# Patient Record
Sex: FEMALE | Race: WHITE | NOT HISPANIC OR LATINO | Employment: UNEMPLOYED | URBAN - METROPOLITAN AREA
[De-identification: names, ages, dates, MRNs, and addresses within clinical notes are randomized per-mention and may not be internally consistent; named-entity substitution may affect disease eponyms.]

---

## 2018-07-12 ENCOUNTER — HISTORICAL (OUTPATIENT)
Dept: ADMINISTRATIVE | Facility: HOSPITAL | Age: 53
End: 2018-07-12

## 2018-07-12 LAB
CREAT UR-MCNC: 81 MG/DL
MICROALBUMIN UR-MCNC: 69 MG/L (ref 0–19)
MICROALBUMIN/CREAT RATIO PNL UR: 85.2 MCG/MG CR (ref 0–29)

## 2018-09-10 ENCOUNTER — HISTORICAL (OUTPATIENT)
Dept: INTERNAL MEDICINE | Facility: CLINIC | Age: 53
End: 2018-09-10

## 2018-10-10 ENCOUNTER — HISTORICAL (OUTPATIENT)
Dept: INTERNAL MEDICINE | Facility: CLINIC | Age: 53
End: 2018-10-10

## 2018-10-10 LAB
ABS NEUT (OLG): 4.97 X10(3)/MCL (ref 2.1–9.2)
ALBUMIN SERPL-MCNC: 3.2 GM/DL (ref 3.4–5)
ALBUMIN/GLOB SERPL: 1 RATIO (ref 1–2)
ALP SERPL-CCNC: 72 UNIT/L (ref 45–117)
ALT SERPL-CCNC: 17 UNIT/L (ref 12–78)
AST SERPL-CCNC: 7 UNIT/L (ref 15–37)
BASOPHILS # BLD AUTO: 0.04 X10(3)/MCL
BASOPHILS NFR BLD AUTO: 0 %
BILIRUB SERPL-MCNC: 0.2 MG/DL (ref 0.2–1)
BILIRUBIN DIRECT+TOT PNL SERPL-MCNC: <0.1 MG/DL
BILIRUBIN DIRECT+TOT PNL SERPL-MCNC: ABNORMAL MG/DL
BUN SERPL-MCNC: 15 MG/DL (ref 7–18)
CALCIUM SERPL-MCNC: 9 MG/DL (ref 8.5–10.1)
CHLORIDE SERPL-SCNC: 103 MMOL/L (ref 98–107)
CHOLEST SERPL-MCNC: 294 MG/DL
CHOLEST/HDLC SERPL: 4.9 {RATIO} (ref 0–4.4)
CO2 SERPL-SCNC: 29 MMOL/L (ref 21–32)
CREAT SERPL-MCNC: 0.7 MG/DL (ref 0.6–1.3)
EOSINOPHIL # BLD AUTO: 0.24 X10(3)/MCL
EOSINOPHIL NFR BLD AUTO: 3 %
ERYTHROCYTE [DISTWIDTH] IN BLOOD BY AUTOMATED COUNT: 12.6 % (ref 11.5–14.5)
EST. AVERAGE GLUCOSE BLD GHB EST-MCNC: 272 MG/DL
GLOBULIN SER-MCNC: 4.5 GM/ML (ref 2.3–3.5)
GLUCOSE SERPL-MCNC: 163 MG/DL (ref 74–106)
HBA1C MFR BLD: 11.1 % (ref 4.2–6.3)
HCT VFR BLD AUTO: 43.2 % (ref 35–46)
HDLC SERPL-MCNC: 60 MG/DL
HGB BLD-MCNC: 14.1 GM/DL (ref 12–16)
IMM GRANULOCYTES # BLD AUTO: 0.1 10*3/UL
IMM GRANULOCYTES NFR BLD AUTO: 1 %
LDLC SERPL CALC-MCNC: 191 MG/DL (ref 0–130)
LYMPHOCYTES # BLD AUTO: 3.2 X10(3)/MCL
LYMPHOCYTES NFR BLD AUTO: 34 % (ref 13–40)
MCH RBC QN AUTO: 28.8 PG (ref 26–34)
MCHC RBC AUTO-ENTMCNC: 32.6 GM/DL (ref 31–37)
MCV RBC AUTO: 88.3 FL (ref 80–100)
MONOCYTES # BLD AUTO: 0.75 X10(3)/MCL
MONOCYTES NFR BLD AUTO: 8 % (ref 4–12)
NEUTROPHILS # BLD AUTO: 4.97 X10(3)/MCL
NEUTROPHILS NFR BLD AUTO: 53 X10(3)/MCL
PLATELET # BLD AUTO: 303 X10(3)/MCL (ref 130–400)
PMV BLD AUTO: 10 FL (ref 7.4–10.4)
POTASSIUM SERPL-SCNC: 3.8 MMOL/L (ref 3.5–5.1)
PROT SERPL-MCNC: 7.7 GM/DL (ref 6.4–8.2)
RBC # BLD AUTO: 4.89 X10(6)/MCL (ref 4–5.2)
SODIUM SERPL-SCNC: 139 MMOL/L (ref 136–145)
TRIGL SERPL-MCNC: 214 MG/DL
TSH SERPL-ACNC: 3.15 MIU/L (ref 0.36–3.74)
VLDLC SERPL CALC-MCNC: 43 MG/DL
WBC # SPEC AUTO: 9.3 X10(3)/MCL (ref 4.5–11)

## 2019-07-22 ENCOUNTER — HISTORICAL (OUTPATIENT)
Dept: RADIOLOGY | Facility: HOSPITAL | Age: 54
End: 2019-07-22

## 2019-08-16 ENCOUNTER — HISTORICAL (OUTPATIENT)
Dept: SURGERY | Facility: HOSPITAL | Age: 54
End: 2019-08-16

## 2019-12-30 ENCOUNTER — HISTORICAL (OUTPATIENT)
Dept: ADMINISTRATIVE | Facility: HOSPITAL | Age: 54
End: 2019-12-30

## 2019-12-30 LAB
ABS NEUT (OLG): 5.91 X10(3)/MCL (ref 2.1–9.2)
ALBUMIN SERPL-MCNC: 3.3 GM/DL (ref 3.4–5)
ALBUMIN/GLOB SERPL: 0.7 RATIO (ref 1.1–2)
ALP SERPL-CCNC: 82 UNIT/L (ref 45–117)
ALT SERPL-CCNC: 18 UNIT/L (ref 12–78)
AST SERPL-CCNC: 10 UNIT/L (ref 15–37)
BASOPHILS # BLD AUTO: 0 X10(3)/MCL (ref 0–0.2)
BASOPHILS NFR BLD AUTO: 0 %
BILIRUB SERPL-MCNC: 0.2 MG/DL (ref 0.2–1)
BILIRUBIN DIRECT+TOT PNL SERPL-MCNC: <0.1 MG/DL (ref 0–0.2)
BILIRUBIN DIRECT+TOT PNL SERPL-MCNC: ABNORMAL MG/DL
BUN SERPL-MCNC: 13 MG/DL (ref 7–18)
CALCIUM SERPL-MCNC: 8.8 MG/DL (ref 8.5–10.1)
CHLORIDE SERPL-SCNC: 102 MMOL/L (ref 98–107)
CHOLEST SERPL-MCNC: 323 MG/DL
CHOLEST/HDLC SERPL: 4.7 {RATIO} (ref 0–4.4)
CO2 SERPL-SCNC: 28 MMOL/L (ref 21–32)
CREAT SERPL-MCNC: 1 MG/DL (ref 0.6–1.3)
EOSINOPHIL # BLD AUTO: 0.2 X10(3)/MCL (ref 0–0.9)
EOSINOPHIL NFR BLD AUTO: 2 %
ERYTHROCYTE [DISTWIDTH] IN BLOOD BY AUTOMATED COUNT: 12.7 % (ref 11.5–14.5)
EST. AVERAGE GLUCOSE BLD GHB EST-MCNC: 266 MG/DL
GLOBULIN SER-MCNC: 4.5 GM/ML (ref 2.3–3.5)
GLUCOSE SERPL-MCNC: 204 MG/DL (ref 74–106)
HBA1C MFR BLD: 10.9 % (ref 4.2–6.3)
HCT VFR BLD AUTO: 41.1 % (ref 35–46)
HDLC SERPL-MCNC: 69 MG/DL (ref 40–59)
HGB BLD-MCNC: 12.8 GM/DL (ref 12–16)
IMM GRANULOCYTES # BLD AUTO: 0.07 10*3/UL
IMM GRANULOCYTES NFR BLD AUTO: 1 %
LDLC SERPL CALC-MCNC: 205 MG/DL
LYMPHOCYTES # BLD AUTO: 1.2 X10(3)/MCL (ref 0.6–4.6)
LYMPHOCYTES NFR BLD AUTO: 15 %
MCH RBC QN AUTO: 29.3 PG (ref 26–34)
MCHC RBC AUTO-ENTMCNC: 31.1 GM/DL (ref 31–37)
MCV RBC AUTO: 94.1 FL (ref 80–100)
MONOCYTES # BLD AUTO: 0.8 X10(3)/MCL (ref 0.1–1.3)
MONOCYTES NFR BLD AUTO: 10 %
NEUTROPHILS # BLD AUTO: 5.91 X10(3)/MCL (ref 2.1–9.2)
NEUTROPHILS NFR BLD AUTO: 72 %
PLATELET # BLD AUTO: 299 X10(3)/MCL (ref 130–400)
PMV BLD AUTO: 10 FL (ref 7.4–10.4)
POTASSIUM SERPL-SCNC: 4.8 MMOL/L (ref 3.5–5.1)
PROT SERPL-MCNC: 7.8 GM/DL (ref 6.4–8.2)
RBC # BLD AUTO: 4.37 X10(6)/MCL (ref 4–5.2)
SODIUM SERPL-SCNC: 134 MMOL/L (ref 136–145)
TRIGL SERPL-MCNC: 246 MG/DL
TSH SERPL-ACNC: 3.45 MIU/L (ref 0.36–3.74)
VLDLC SERPL CALC-MCNC: 49 MG/DL
WBC # SPEC AUTO: 8.2 X10(3)/MCL (ref 4.5–11)

## 2021-12-20 ENCOUNTER — HOSPITAL ENCOUNTER (OUTPATIENT)
Dept: MEDSURG UNIT | Facility: HOSPITAL | Age: 56
End: 2021-12-22
Attending: HOSPITALIST | Admitting: STUDENT IN AN ORGANIZED HEALTH CARE EDUCATION/TRAINING PROGRAM

## 2021-12-20 LAB
% SATURATION: 89.4 %
ABS NEUT (OLG): 11.44 X10(3)/MCL (ref 2.1–9.2)
ALBUMIN SERPL-MCNC: 2.6 GM/DL (ref 3.5–5)
ALBUMIN SERPL-MCNC: 2.9 GM/DL (ref 3.5–5)
ALBUMIN/GLOB SERPL: 0.6 RATIO (ref 1.1–2)
ALBUMIN/GLOB SERPL: 0.6 RATIO (ref 1.1–2)
ALP SERPL-CCNC: 74 UNIT/L (ref 40–150)
ALP SERPL-CCNC: 85 UNIT/L (ref 40–150)
ALT SERPL-CCNC: 11 UNIT/L (ref 0–55)
ALT SERPL-CCNC: 7 UNIT/L (ref 0–55)
APPEARANCE, UA: CLEAR
AST SERPL-CCNC: 8 UNIT/L (ref 5–34)
AST SERPL-CCNC: 8 UNIT/L (ref 5–34)
B-OH-BUTYR SERPL-MCNC: 3.08 MMOL/L
B-OH-BUTYR SERPL-MCNC: 8.73 MMOL/L
BACTERIA SPEC CULT: ABNORMAL
BASOPHILS # BLD AUTO: 0 X10(3)/MCL (ref 0–0.2)
BASOPHILS NFR BLD AUTO: 0 %
BILIRUB SERPL-MCNC: 0.2 MG/DL
BILIRUB SERPL-MCNC: 0.3 MG/DL
BILIRUB UR QL STRIP: NEGATIVE
BILIRUBIN DIRECT+TOT PNL SERPL-MCNC: 0.1 MG/DL (ref 0–0.5)
BILIRUBIN DIRECT+TOT PNL SERPL-MCNC: 0.1 MG/DL (ref 0–0.8)
BILIRUBIN DIRECT+TOT PNL SERPL-MCNC: 0.1 MG/DL (ref 0–0.8)
BILIRUBIN DIRECT+TOT PNL SERPL-MCNC: 0.2 MG/DL (ref 0–0.5)
BUN SERPL-MCNC: 16.5 MG/DL (ref 9.8–20.1)
BUN SERPL-MCNC: 16.6 MG/DL (ref 9.8–20.1)
BUN SERPL-MCNC: 16.8 MG/DL (ref 9.8–20.1)
BUN SERPL-MCNC: 19.3 MG/DL (ref 9.8–20.1)
CALCIUM SERPL-MCNC: 8.9 MG/DL (ref 8.7–10.5)
CALCIUM SERPL-MCNC: 9 MG/DL (ref 8.7–10.5)
CALCIUM SERPL-MCNC: 9.4 MG/DL (ref 8.7–10.5)
CALCIUM SERPL-MCNC: 9.6 MG/DL (ref 8.7–10.5)
CHLORIDE SERPL-SCNC: 103 MMOL/L (ref 98–107)
CHLORIDE SERPL-SCNC: 104 MMOL/L (ref 98–107)
CHLORIDE SERPL-SCNC: 105 MMOL/L (ref 98–107)
CHLORIDE SERPL-SCNC: 94 MMOL/L (ref 98–107)
CK MB SERPL-MCNC: 0.6 NG/ML
CK SERPL-CCNC: 36 U/L (ref 29–168)
CO HGB VEN: 2.9 % (ref 0.5–1.5)
CO2 SERPL-SCNC: 16 MMOL/L (ref 22–29)
CO2 SERPL-SCNC: 21 MMOL/L (ref 22–29)
CO2 SERPL-SCNC: 23 MMOL/L (ref 22–29)
CO2 SERPL-SCNC: 23 MMOL/L (ref 22–29)
COLOR UR: COLORLESS
CREAT SERPL-MCNC: 0.99 MG/DL (ref 0.55–1.02)
CREAT SERPL-MCNC: 1.07 MG/DL (ref 0.55–1.02)
CREAT SERPL-MCNC: 1.19 MG/DL (ref 0.55–1.02)
CREAT SERPL-MCNC: 1.47 MG/DL (ref 0.55–1.02)
CREAT/UREA NIT SERPL: 16
CREAT/UREA NIT SERPL: 17
D BASE VENOUS: -16.3 (ref -2–2)
EOSINOPHIL # BLD AUTO: 0 X10(3)/MCL (ref 0–0.9)
EOSINOPHIL NFR BLD AUTO: 0 %
ERYTHROCYTE [DISTWIDTH] IN BLOOD BY AUTOMATED COUNT: 13.1 % (ref 11.5–14.5)
FLUAV AG UPPER RESP QL IA.RAPID: NEGATIVE
FLUBV AG UPPER RESP QL IA.RAPID: NEGATIVE
GLOBULIN SER-MCNC: 4.4 GM/DL (ref 2.4–3.5)
GLOBULIN SER-MCNC: 4.7 GM/DL (ref 2.4–3.5)
GLUCOSE (UA): ABNORMAL /UL
GLUCOSE SERPL-MCNC: 168 MG/DL (ref 74–100)
GLUCOSE SERPL-MCNC: 174 MG/DL (ref 74–100)
GLUCOSE SERPL-MCNC: 266 MG/DL (ref 74–100)
GLUCOSE SERPL-MCNC: 620 MG/DL (ref 74–100)
HCO3 VENOUS: 13 MMOL/L (ref 25–40)
HCT VFR BLD AUTO: 42.8 % (ref 35–46)
HGB BLD-MCNC: 13.1 GM/DL (ref 12–16)
HGB UR QL STRIP: NEGATIVE /HPF
HYALINE CASTS #/AREA URNS LPF: ABNORMAL /LPF
IMM GRANULOCYTES # BLD AUTO: 0.08 10*3/UL
IMM GRANULOCYTES NFR BLD AUTO: 0 %
KETONES UR QL STRIP: ABNORMAL /UL
LACTATE SERPL-SCNC: 1.5 MMOL/L (ref 0.5–2.2)
LEUKOCYTE ESTERASE UR QL STRIP: 75
LIPASE SERPL-CCNC: 15 U/L
LYMPHOCYTES # BLD AUTO: 1.8 X10(3)/MCL (ref 0.6–4.6)
LYMPHOCYTES NFR BLD AUTO: 12 %
MAGNESIUM SERPL-MCNC: 1.6 MG/DL (ref 1.6–2.6)
MAGNESIUM SERPL-MCNC: 1.6 MG/DL (ref 1.6–2.6)
MAGNESIUM SERPL-MCNC: 1.7 MG/DL (ref 1.6–2.6)
MAGNESIUM SERPL-MCNC: 1.9 MG/DL (ref 1.6–2.6)
MCH RBC QN AUTO: 28.8 PG (ref 26–34)
MCHC RBC AUTO-ENTMCNC: 30.6 GM/DL (ref 31–37)
MCV RBC AUTO: 94.1 FL (ref 80–100)
MONOCYTES # BLD AUTO: 1.4 X10(3)/MCL (ref 0.1–1.3)
MONOCYTES NFR BLD AUTO: 9 %
MRSA SCREEN BY PCR: POSITIVE
MUCOUS THREADS URNS QL MICRO: ABNORMAL /LPF
NEUTROPHILS # BLD AUTO: 11.44 X10(3)/MCL (ref 2.1–9.2)
NEUTROPHILS NFR BLD AUTO: 78 %
NITRITE UR QL STRIP: NEGATIVE
NRBC BLD AUTO-RTO: 0 % (ref 0–0.2)
O2 HGB VENOUS: 86.8 % (ref 40–85)
PCO2 VENOUS: 41 MMHG (ref 41–51)
PH UR STRIP: 5.5 /UL (ref 4.5–8)
PH VENOUS: 7.1 (ref 7.32–7.42)
PHENYTOIN SERPL-MCNC: <1.8 UG/ML (ref 10–20)
PHOSPHATE SERPL-MCNC: 1.3 MG/DL (ref 2.3–4.7)
PHOSPHATE SERPL-MCNC: 1.7 MG/DL (ref 2.3–4.7)
PHOSPHATE SERPL-MCNC: 2.1 MG/DL (ref 2.3–4.7)
PHOSPHATE SERPL-MCNC: 3.8 MG/DL (ref 2.3–4.7)
PLATELET # BLD AUTO: 307 X10(3)/MCL (ref 130–400)
PMV BLD AUTO: 11 FL (ref 7.4–10.4)
PO2 VENOUS: 62 MMHG (ref 30–100)
POTASSIUM SERPL-SCNC: 3.8 MMOL/L (ref 3.5–5.1)
POTASSIUM SERPL-SCNC: 3.9 MMOL/L (ref 3.5–5.1)
POTASSIUM SERPL-SCNC: 4.5 MMOL/L (ref 3.5–5.1)
POTASSIUM SERPL-SCNC: 5.5 MMOL/L (ref 3.5–5.1)
PROT SERPL-MCNC: 7 GM/DL (ref 6.4–8.3)
PROT SERPL-MCNC: 7.6 GM/DL (ref 6.4–8.3)
PROT UR QL STRIP: ABNORMAL /UL
RBC # BLD AUTO: 4.55 X10(6)/MCL (ref 4–5.2)
RBC #/AREA URNS HPF: ABNORMAL /HPF
SAMPLE VEN: ABNORMAL
SARS-COV-2 RNA RESP QL NAA+PROBE: NOT DETECTED
SITE VEN: ABNORMAL
SODIUM SERPL-SCNC: 132 MMOL/L (ref 136–145)
SODIUM SERPL-SCNC: 136 MMOL/L (ref 136–145)
SODIUM SERPL-SCNC: 137 MMOL/L (ref 136–145)
SODIUM SERPL-SCNC: 137 MMOL/L (ref 136–145)
SP GR UR STRIP: 1.02 (ref 1–1.03)
SQUAMOUS EPITHELIAL, UA: ABNORMAL /LPF
THB VEN: 12.9 GM/DL (ref 12–18)
TREATMENT VEN: ABNORMAL
TROPONIN I SERPL-MCNC: 0.01 NG/ML (ref 0–0.04)
UROBILINOGEN UR STRIP-ACNC: NORMAL /HPF
WBC # SPEC AUTO: 14.8 X10(3)/MCL (ref 4.5–11)
WBC #/AREA URNS HPF: ABNORMAL /HPF

## 2021-12-21 LAB
ABS NEUT (OLG): 9.8 X10(3)/MCL (ref 2.1–9.2)
ALBUMIN SERPL-MCNC: 2.1 GM/DL (ref 3.5–5)
ALBUMIN/GLOB SERPL: 0.6 RATIO (ref 1.1–2)
ALP SERPL-CCNC: 59 UNIT/L (ref 40–150)
ALT SERPL-CCNC: 5 UNIT/L (ref 0–55)
AST SERPL-CCNC: 9 UNIT/L (ref 5–34)
BASOPHILS # BLD AUTO: 0 X10(3)/MCL (ref 0–0.2)
BASOPHILS NFR BLD AUTO: 0 %
BILIRUB SERPL-MCNC: 0.1 MG/DL
BILIRUBIN DIRECT+TOT PNL SERPL-MCNC: 0 MG/DL (ref 0–0.8)
BILIRUBIN DIRECT+TOT PNL SERPL-MCNC: 0.1 MG/DL (ref 0–0.5)
BUN SERPL-MCNC: 18.1 MG/DL (ref 9.8–20.1)
CALCIUM SERPL-MCNC: 8.5 MG/DL (ref 8.7–10.5)
CHLORIDE SERPL-SCNC: 103 MMOL/L (ref 98–107)
CO2 SERPL-SCNC: 20 MMOL/L (ref 22–29)
CREAT SERPL-MCNC: 1.16 MG/DL (ref 0.55–1.02)
EOSINOPHIL # BLD AUTO: 0 X10(3)/MCL (ref 0–0.9)
EOSINOPHIL NFR BLD AUTO: 0 %
ERYTHROCYTE [DISTWIDTH] IN BLOOD BY AUTOMATED COUNT: 13.2 % (ref 11.5–14.5)
GLOBULIN SER-MCNC: 3.7 GM/DL (ref 2.4–3.5)
GLUCOSE SERPL-MCNC: 243 MG/DL (ref 74–100)
HCT VFR BLD AUTO: 31.6 % (ref 35–46)
HGB BLD-MCNC: 10.2 GM/DL (ref 12–16)
IMM GRANULOCYTES # BLD AUTO: 0.07 10*3/UL
IMM GRANULOCYTES NFR BLD AUTO: 0 %
LYMPHOCYTES # BLD AUTO: 2.2 X10(3)/MCL (ref 0.6–4.6)
LYMPHOCYTES NFR BLD AUTO: 16 %
MAGNESIUM SERPL-MCNC: 1.7 MG/DL (ref 1.6–2.6)
MCH RBC QN AUTO: 29.3 PG (ref 26–34)
MCHC RBC AUTO-ENTMCNC: 32.3 GM/DL (ref 31–37)
MCV RBC AUTO: 90.8 FL (ref 80–100)
MONOCYTES # BLD AUTO: 1.3 X10(3)/MCL (ref 0.1–1.3)
MONOCYTES NFR BLD AUTO: 10 %
NEUTROPHILS # BLD AUTO: 9.8 X10(3)/MCL (ref 2.1–9.2)
NEUTROPHILS NFR BLD AUTO: 73 %
NRBC BLD AUTO-RTO: 0 % (ref 0–0.2)
PHOSPHATE SERPL-MCNC: 2.2 MG/DL (ref 2.3–4.7)
PLATELET # BLD AUTO: 263 X10(3)/MCL (ref 130–400)
PMV BLD AUTO: 10.9 FL (ref 7.4–10.4)
POTASSIUM SERPL-SCNC: 3.9 MMOL/L (ref 3.5–5.1)
PROT SERPL-MCNC: 5.8 GM/DL (ref 6.4–8.3)
RBC # BLD AUTO: 3.48 X10(6)/MCL (ref 4–5.2)
SODIUM SERPL-SCNC: 135 MMOL/L (ref 136–145)
WBC # SPEC AUTO: 13.4 X10(3)/MCL (ref 4.5–11)

## 2021-12-22 LAB
ABS NEUT (OLG): 5.59 X10(3)/MCL (ref 2.1–9.2)
ALBUMIN SERPL-MCNC: 2.4 GM/DL (ref 3.5–5)
ALBUMIN/GLOB SERPL: 0.5 RATIO (ref 1.1–2)
ALP SERPL-CCNC: 68 UNIT/L (ref 40–150)
ALT SERPL-CCNC: 7 UNIT/L (ref 0–55)
AMMONIA PLAS-MSCNC: 27.7 UMOL/L (ref 18–72)
AST SERPL-CCNC: 12 UNIT/L (ref 5–34)
BASOPHILS # BLD AUTO: 0 X10(3)/MCL (ref 0–0.2)
BASOPHILS NFR BLD AUTO: 0 %
BILIRUB SERPL-MCNC: 0.2 MG/DL
BILIRUBIN DIRECT+TOT PNL SERPL-MCNC: 0.1 MG/DL (ref 0–0.5)
BILIRUBIN DIRECT+TOT PNL SERPL-MCNC: 0.1 MG/DL (ref 0–0.8)
BUN SERPL-MCNC: 12.5 MG/DL (ref 9.8–20.1)
CALCIUM SERPL-MCNC: 9.2 MG/DL (ref 8.7–10.5)
CHLORIDE SERPL-SCNC: 103 MMOL/L (ref 98–107)
CO2 SERPL-SCNC: 24 MMOL/L (ref 22–29)
CREAT SERPL-MCNC: 0.73 MG/DL (ref 0.55–1.02)
EOSINOPHIL # BLD AUTO: 0.1 X10(3)/MCL (ref 0–0.9)
EOSINOPHIL NFR BLD AUTO: 1 %
ERYTHROCYTE [DISTWIDTH] IN BLOOD BY AUTOMATED COUNT: 13.3 % (ref 11.5–14.5)
FINAL CULTURE: NORMAL
FOLATE SERPL-MCNC: 11.3 NG/ML (ref 7–31.4)
GLOBULIN SER-MCNC: 4.4 GM/DL (ref 2.4–3.5)
GLUCOSE SERPL-MCNC: 157 MG/DL (ref 74–100)
HCT VFR BLD AUTO: 34.8 % (ref 35–46)
HGB BLD-MCNC: 10.8 GM/DL (ref 12–16)
IMM GRANULOCYTES # BLD AUTO: 0.04 10*3/UL
IMM GRANULOCYTES NFR BLD AUTO: 0 %
LYMPHOCYTES # BLD AUTO: 2.1 X10(3)/MCL (ref 0.6–4.6)
LYMPHOCYTES NFR BLD AUTO: 24 %
MCH RBC QN AUTO: 28.8 PG (ref 26–34)
MCHC RBC AUTO-ENTMCNC: 31 GM/DL (ref 31–37)
MCV RBC AUTO: 92.8 FL (ref 80–100)
MONOCYTES # BLD AUTO: 0.7 X10(3)/MCL (ref 0.1–1.3)
MONOCYTES NFR BLD AUTO: 8 %
NEUTROPHILS # BLD AUTO: 5.59 X10(3)/MCL (ref 2.1–9.2)
NEUTROPHILS NFR BLD AUTO: 66 %
NRBC BLD AUTO-RTO: 0 % (ref 0–0.2)
PLATELET # BLD AUTO: 278 X10(3)/MCL (ref 130–400)
PMV BLD AUTO: 10.6 FL (ref 7.4–10.4)
POTASSIUM SERPL-SCNC: 4 MMOL/L (ref 3.5–5.1)
PROT SERPL-MCNC: 6.8 GM/DL (ref 6.4–8.3)
RBC # BLD AUTO: 3.75 X10(6)/MCL (ref 4–5.2)
SODIUM SERPL-SCNC: 136 MMOL/L (ref 136–145)
T PALLIDUM AB SER QL: NONREACTIVE
TSH SERPL-ACNC: 0.54 UIU/ML (ref 0.35–4.94)
VANCOMYCIN TROUGH SERPL-MCNC: 17.3 UG/ML (ref 15–20)
VIT B12 SERPL-MCNC: 1093 PG/ML (ref 213–816)
WBC # SPEC AUTO: 8.5 X10(3)/MCL (ref 4.5–11)

## 2021-12-25 LAB
FINAL CULTURE: NORMAL
FINAL CULTURE: NORMAL

## 2022-01-26 ENCOUNTER — HISTORICAL (OUTPATIENT)
Dept: ADMINISTRATIVE | Facility: HOSPITAL | Age: 57
End: 2022-01-26

## 2022-01-26 LAB
ABS NEUT (OLG): 5.35 X10(3)/MCL (ref 2.1–9.2)
ALBUMIN SERPL-MCNC: 3.3 GM/DL (ref 3.5–5)
ALBUMIN/GLOB SERPL: 0.7 RATIO (ref 1.1–2)
ALP SERPL-CCNC: 99 UNIT/L (ref 40–150)
ALT SERPL-CCNC: 14 UNIT/L (ref 0–55)
APPEARANCE, UA: ABNORMAL
AST SERPL-CCNC: 12 UNIT/L (ref 5–34)
BACTERIA SPEC CULT: ABNORMAL /HPF
BASOPHILS # BLD AUTO: 0.1 X10(3)/MCL (ref 0–0.2)
BASOPHILS NFR BLD AUTO: 1 %
BILIRUB SERPL-MCNC: 0.2 MG/DL
BILIRUB UR QL STRIP: NEGATIVE
BILIRUBIN DIRECT+TOT PNL SERPL-MCNC: 0.1 MG/DL (ref 0–0.5)
BILIRUBIN DIRECT+TOT PNL SERPL-MCNC: 0.1 MG/DL (ref 0–0.8)
BUN SERPL-MCNC: 16.3 MG/DL (ref 9.8–20.1)
CALCIUM SERPL-MCNC: 10.2 MG/DL (ref 8.7–10.5)
CHLORIDE SERPL-SCNC: 102 MMOL/L (ref 98–107)
CHOLEST SERPL-MCNC: 253 MG/DL
CHOLEST/HDLC SERPL: 5 {RATIO} (ref 0–5)
CO2 SERPL-SCNC: 26 MMOL/L (ref 22–29)
COLOR UR: ABNORMAL
CREAT SERPL-MCNC: 0.94 MG/DL (ref 0.55–1.02)
CREAT UR-MCNC: 76.7 MG/DL (ref 45–106)
DEPRECATED CALCIDIOL+CALCIFEROL SERPL-MC: 16 NG/ML (ref 30–80)
EOSINOPHIL # BLD AUTO: 0.2 X10(3)/MCL (ref 0–0.9)
EOSINOPHIL NFR BLD AUTO: 3 %
ERYTHROCYTE [DISTWIDTH] IN BLOOD BY AUTOMATED COUNT: 12.5 % (ref 11.5–14.5)
EST. AVERAGE GLUCOSE BLD GHB EST-MCNC: 326.4 MG/DL
GLOBULIN SER-MCNC: 4.9 GM/DL (ref 2.4–3.5)
GLUCOSE (UA): ABNORMAL /UL
GLUCOSE SERPL-MCNC: 300 MG/DL (ref 74–100)
HBA1C MFR BLD: 13 %
HCT VFR BLD AUTO: 42 % (ref 35–46)
HDLC SERPL-MCNC: 54 MG/DL (ref 35–60)
HGB BLD-MCNC: 13.2 GM/DL (ref 12–16)
HGB UR QL STRIP: NEGATIVE /HPF
HYALINE CASTS #/AREA URNS LPF: ABNORMAL /LPF
IMM GRANULOCYTES # BLD AUTO: 0.05 10*3/UL
IMM GRANULOCYTES NFR BLD AUTO: 1 %
KETONES UR QL STRIP: NEGATIVE /UL
LDLC SERPL CALC-MCNC: 159 MG/DL (ref 50–140)
LEFT EYE DM RETINOPATHY: NEGATIVE
LEUKOCYTE ESTERASE UR QL STRIP: NEGATIVE
LYMPHOCYTES # BLD AUTO: 2.2 X10(3)/MCL (ref 0.6–4.6)
LYMPHOCYTES NFR BLD AUTO: 26 %
MCH RBC QN AUTO: 28.9 PG (ref 26–34)
MCHC RBC AUTO-ENTMCNC: 31.4 GM/DL (ref 31–37)
MCV RBC AUTO: 91.9 FL (ref 80–100)
MICROALBUMIN UR-MCNC: 1822.8 MG/L
MICROALBUMIN/CREAT RATIO PNL UR: 2376.5 MG/GM CR (ref 0–30)
MONOCYTES # BLD AUTO: 0.6 X10(3)/MCL (ref 0.1–1.3)
MONOCYTES NFR BLD AUTO: 7 %
MUCOUS THREADS URNS QL MICRO: ABNORMAL /LPF
NEUTROPHILS # BLD AUTO: 5.35 X10(3)/MCL (ref 2.1–9.2)
NEUTROPHILS NFR BLD AUTO: 63 %
NITRITE UR QL STRIP: NEGATIVE
NRBC BLD AUTO-RTO: 0 % (ref 0–0.2)
PH UR STRIP: 5.5 /UL (ref 4.5–8)
PLATELET # BLD AUTO: 429 X10(3)/MCL (ref 130–400)
PMV BLD AUTO: 10.1 FL (ref 7.4–10.4)
POTASSIUM SERPL-SCNC: 4.8 MMOL/L (ref 3.5–5.1)
PROT SERPL-MCNC: 8.2 GM/DL (ref 6.4–8.3)
PROT UR QL STRIP: ABNORMAL /UL
RBC # BLD AUTO: 4.57 X10(6)/MCL (ref 4–5.2)
RBC #/AREA URNS HPF: ABNORMAL /HPF
RIGHT EYE DM RETINOPATHY: POSITIVE
SODIUM SERPL-SCNC: 137 MMOL/L (ref 136–145)
SP GR UR STRIP: 1.02 (ref 1–1.03)
SQUAMOUS EPITHELIAL, UA: ABNORMAL
TRIGL SERPL-MCNC: 198 MG/DL (ref 37–140)
UROBILINOGEN UR STRIP-ACNC: NORMAL /HPF
VLDLC SERPL CALC-MCNC: 40 MG/DL
WBC # SPEC AUTO: 8.5 X10(3)/MCL (ref 4.5–11)
WBC #/AREA URNS HPF: ABNORMAL /HPF

## 2022-02-08 ENCOUNTER — HISTORICAL (OUTPATIENT)
Dept: CARDIOLOGY | Facility: HOSPITAL | Age: 57
End: 2022-02-08

## 2022-02-08 LAB — OCCULT BLOOD, FECAL, IA: NEGATIVE

## 2022-04-11 ENCOUNTER — HISTORICAL (OUTPATIENT)
Dept: ADMINISTRATIVE | Facility: HOSPITAL | Age: 57
End: 2022-04-11
Payer: MEDICAID

## 2022-04-26 VITALS
WEIGHT: 194.44 LBS | HEIGHT: 60 IN | OXYGEN SATURATION: 97 % | BODY MASS INDEX: 38.18 KG/M2 | SYSTOLIC BLOOD PRESSURE: 131 MMHG | DIASTOLIC BLOOD PRESSURE: 82 MMHG

## 2022-04-27 DIAGNOSIS — R56.9 SEIZURES: Primary | ICD-10-CM

## 2022-04-29 ENCOUNTER — HISTORICAL (OUTPATIENT)
Dept: RADIOLOGY | Facility: HOSPITAL | Age: 57
End: 2022-04-29
Payer: MEDICAID

## 2022-04-29 LAB — CREAT SERPL-MCNC: 1.41 MG/DL (ref 0.55–1.02)

## 2022-04-30 NOTE — PROGRESS NOTES
Patient:   Chasidy Salter             MRN: [id number]            FIN: [id number]               Age:   56 years     Sex:  Female     :  1965   Associated Diagnoses:   None   Author:   Esteban YUN, Kimo GARZA      called to check on patient, left message

## 2022-04-30 NOTE — ED PROVIDER NOTES
Patient:   Chasidy Salter             MRN: [id number]            FIN: [id number]               Age:   56 years     Sex:  Female     :  1965   Associated Diagnoses:   DKA, type 1; Lung infiltrate; DKA, type 1; Lung infiltrate   Author:   Opal Noland      Basic Information   Time seen: Date 2021, Immediately upon arrival.   History source: Patient.   Arrival mode: Private vehicle.   History limitation: None.   Additional information: Chief Complaint from Nursing Triage Note : Chief Complaint   2021 10:05 CST     Chief Complaint           Nausea/Vomiting  .   Provider Assignment.   History of Present Illness   The patient presents with cough and nausea/ vomiting.  The onset was today.  The course/duration of symptoms is constant.  Character productive: white and yellow.  The degree at onset was minimal.  The degree at present is moderate.  The exacerbating factor is none.  The relieving factor is none.  Risk factors consist of asthma, diabetes mellitus and hypertension.  Prior episodes: none.  Therapy today: see nurses notes.  Associated symptoms: shortness of breath, nasal congestion and chills.  Additional history: none.        Review of Systems   Constitutional symptoms:  No fever, no chills.    Skin symptoms   Eye symptoms:  Vision unchanged.   ENMT symptoms:  Nasal congestion.   Respiratory symptoms:  Shortness of breath, cough, no stridor, no wheezing, Sputum production: Yellow.    Cardiovascular symptoms:  No chest pain, no syncope, no peripheral edema.    Gastrointestinal symptoms:  Nausea, vomiting, no abdominal pain, no diarrhea.    Genitourinary symptoms:  No dysuria,    Musculoskeletal symptoms:  No back pain, no Muscle pain, no Joint pain.    Neurologic symptoms:  No headache, no dizziness.              Additional review of systems information: All other systems reviewed and otherwise negative.      Health Status   Allergies:    Allergic Reactions (Selected)  No Known  Medication Allergies,    Allergies (1) Active Reaction  No Known Medication Allergies None Documented  .   Medications:  (Selected)   Inpatient Medications  Ordered  Normal Saline Infusion: 1,000 mL, 1,000 mL, IV, Now, 1000 mL/hr, start date 12/20/21 10:19:00 CST, stop date 12/20/21 10:19:00 CST, STAT  Zofran 2 mg/mL injectable solution: 4 mg, form: Injection, IV Push, Once-NOW, first dose 12/20/21 10:20:00 CST, stop date 12/20/21 10:20:00 CST, STAT  Prescriptions  Prescribed  BD INSULIN SYRINGES UF 1ML [id number]: BD INSULIN SYRINGES UF 1ML [id number], See Instructions, Subcutaneous TID.  DX. CODE E11.9 garcía 99 months, # 100 EA, 6 Refill(s), [id number], 152, cm, Height/Length Dosing, 12/30/19 8:24:00 CST, 97.5, kg, Weight Dosing, 12/30/19 8:24:00 CST...  BD UF1ml  MINI PEN NEEDLE [id number]: BD UF1ml  MINI PEN NEEDLE [id number], See Instructions, USE DAILY DX Code E11.9 garcía 99 months, # 100 syringe(s), 6 Refill(s), [id number], 152, cm, Height/Length Dosing, 12/30/19 8:24:00 CST, 97.5, kg, Weight Dosing, 12/30/19 8:24:00 CST...  Bentyl 10 mg oral capsule: 10 mg = 1 cap(s), Oral, QID, PRN PRN abdominal pain, 30 to 60 minutes before meals, # 28 cap(s), 0 Refill(s), Pharmacy: [facility]  Dilantin 100 mg oral capsule, extended release: 1 cap, Oral, TID, # 270 cap(s), 3 Refill(s), Pharmacy: [facility]/pharmacy #[id number], 152, cm, Height/Length Dosing, 12/30/19 8:24:00 CST, 97.5, kg, Weight Dosing, 12/30/19 8:24:00 CST  GLUCOMETER MACHINE: GLUCOMETER MACHINE, See Instructions, USE DAILY, # 1 units, 0 Refill(s), Pharmacy: [facility]  GLUCOMETER STRIPS AND LANCETS: GLUCOMETER STRIPS AND LANCETS, See Instructions, USE TID AC, # 200 units, 0 Refill(s)  Insulin syringes: Insulin syringes, See Instructions, Use 3 times a day to inject insuin.  Dx. Code E11.9, # 100 EA, 4 Refill(s), Pharmacy: [facility]/pharmacy #[id number]  Lancets: Lancets, See Instructions, Use to check blood glucose twice daily.  Dx. Code  E11.9, # 200 EA, 6 Refill(s), [id number]  Lipitor 40 mg oral tablet: 40 mg = 1 tab(s), Oral, Daily, # 90 tab(s), 3 Refill(s), [id number], 152, cm, Height/Length Dosing, 12/30/19 8:24:00 CST, 97.5, kg, Weight Dosing, 12/30/19 8:24:00 CST  Misc Prescription: Misc Prescription, See Instructions, lancets to check blood glucose levels three times daily E11.9 DEEP 99 months, # 200 EA, 6 Refill(s), [id number], 152, cm, Height/Length Dosing, 12/30/19 8:24:00 CST, 97.5, kg, Weight Dosing, 12/30/...  Mobic 7.5 mg oral tablet: 7.5 mg = 1 tab(s), Oral, BID, PRN PRN pain, mild, # 60 tab(s), 11 Refill(s), [id number], 152, cm, Height/Length Dosing, 12/30/19 8:24:00 CST, 97.5, kg, Weight Dosing, 12/30/19 8:24:00 CST  Mobic 7.5 mg oral tablet: 7.5 mg = 1 tab(s), Oral, Daily, PRN PRN pain, moderate, # 15 tab(s), 0 Refill(s), [id number], 153, cm, Height/Length Dosing, 05/23/21 18:22:00 CDT, 99, kg, Weight Dosing, 05/23/21 18:22:00 CDT  NexIUM 40 mg oral delayed release capsule: 40 mg = 1 cap(s), Oral, Daily, # 90 cap(s), 3 Refill(s), Pharmacy: [facility], 152, cm, Height/Length Dosing, 12/30/19 8:24:00 CST, 97.5, kg, Weight Dosing, 12/30/19 8:24:00 CST  NovoLOG 100 units/mL injectable solution: 15 units, Subcutaneous, TIDAC, # 30 mL, 11 Refill(s), Pharmacy: [facility] #[id number], 152, cm, Height/Length Dosing, 12/30/19 8:24:00 CST, 97.5, kg, Weight Dosing, 12/30/19 8:24:00 CST  ONE TOUCH ULTRA 2 GLUCOMETER: ONE TOUCH ULTRA 2 GLUCOMETER, See Instructions, Use to check blood glucose twice a da.  Dx. code E11.9, # 1 EA, 0 Refill(s), [id number]  One touch ultra blue TEST STRIPS: One touch ultra blue TEST STRIPS, See Instructions, Use to check blood glucose three times daily.  Dx. Code E11.9 DEEP 99 months  Dx. Code Z79.4, # 200 EA, 6 Refill(s), Pharmacy: [facility]/pharmacy #[id number], 152, cm, Height/Length Dosing, 12/30/19  8:24:00 CST,...  Reglan 10 mg oral tablet: 10 mg = 1 tab(s), Oral, QID, # 120 tab(s), 3 Refill(s), Pharmacy: [facility]  Relion Pen Needles: Relion Pen Needles, See Instructions, Relion pen needles [id number]   Qty 50; daily  Dx E11.9, # 1 box(es), 6 Refill(s), [id number]  Ventolin HFA 90 mcg/inh inhalation aerosol: 180 mcg = 2 puff(s), INH, q6hr, PRN PRN as needed for wheezing, # 3 EA, 4 Refill(s), [id number], 152, cm, Height/Length Dosing, 19 8:24:00 CST, 97.5, kg, Weight Dosing, 19 8:24:00 CST  Zofran ODT 4 mg oral tablet, disintegratin mg = 1 tab(s), Oral, q12hr, PRN PRN nausea/vomiting, # 10 tab(s), 0 Refill(s), [id number], 153, cm, Height/Length Dosing, 21 18:22:00 CDT, 99, kg, Weight Dosing, 21 18:22:00 CDT  albuterol 2.5 mg/3 mL (0.083%) inhalation solution: 2.5 mg = 3 mL, INH, q6hr, PRN PRN for wheezing, # 360 mL, 11 Refill(s), [id number], 152, cm, Height/Length Dosing, 19 8:24:00 CST, 97.5, kg, Weight Dosing, 19 8:24:00 CST  cyclobenzaprine 10 mg oral tablet: 10 mg = 1 tab(s), Oral, TID, # 90 tab(s), 5 Refill(s), [id number], 152, cm, Height/Length Dosing, 19 8:24:00 CST, 97.5, kg, Weight Dosing, 19 8:24:00 CST  fluoxetine 10 mg oral capsule: See Instructions, TAKE ONE CAPSULE BY MOUTH EVERY DAY, # 30 EA, 11 Refill(s), Pharmacy: [facility]/pharmacy #[id number], 152, cm, Height/Length Dosing, 19 8:24:00 CST, 97.5, kg, Weight Dosing, 19 8:24:00 CST  fluticasone 50 mcg/inh nasal spray: 100 mcg = 2 spray(s), Nasal, At Bedtime, # 3 EA, 3 Refill(s), Pharmacy: [facility] #[id number], 153, cm, Height/Length Dosing, 21 18:22:00 CDT, 99, kg, Weight Dosing, 21 18:22:00 CDT  glipiZIDE 10 mg oral tablet: 10 mg = 1 tab(s), Oral, BID, # 180 tab(s), 3 Refill(s), Pharmacy: [facility]/pharm[facility] #[id number], 152, cm, Height/Length Dosing, 19 8:24:00 CST, 97.5, kg, Weight Dosing,  12/30/19 8:24:00 CST  insulin glargine 100 units/mL subcutaneous solution: See Instructions, 60 units subq in the am and 50 units  subq at bedtime, # 30 mL, 11 Refill(s), [id number], 152, cm, Height/Length Dosing, 12/30/19 8:24:00 CST, 97.5, kg, Weight Dosing, 12/30/19 8:24:00 CST  lisinopril 10 mg oral tablet: 10 mg = 1 tab(s), Oral, Daily, # 90 tab(s), 3 Refill(s), [id number], 152, cm, Height/Length Dosing, 12/30/19 8:24:00 CST, 97.5, kg, Weight Dosing, 12/30/19 8:24:00 CST  omeprazole 40 mg oral DR capsule: 40 mg = 1 cap(s), Oral, BID, # 180 cap(s), 3 Refill(s), [id number], 152, cm, Height/Length Dosing, 12/30/19 8:24:00 CST, 97.5, kg, Weight Dosing, 12/30/19 8:24:00 CST  ondansetron 4 mg oral tablet: See Instructions, TAKE 1 TABLET BY MOUTH EVERY 8 HOURS AS NEEDED FOR  NAUSEA/VOMITING, # 90 tab(s), 1 Refill(s), eRx: [facility]  pen needle: pen needle, See Instructions, use daily, # 100 EA, 0 Refill(s), [id number]  Documented Medications  Documented  aspirin 81 mg oral capsule: 81 mg = 1 cap(s), Oral, Daily, do not exceed 48 capsules in 24 hours, # 30 cap(s), 0 Refill(s), per nurse's notes.   Immunizations: Per nurse's notes.   Menstrual history: Per nurse's notes.      Past Medical/ Family/ Social History   Medical history:    Active  Hypertension ([id number])  Diabetes ([id number])  Seizures ([id number])  Asthma ([id number])  GERD (gastroesophageal reflux disease) ([id number])  Arthritis ([id number])  Hypercholesteremia ([id number])  Resolved  Pregnant ([id number]): Onset on 6/27/1991 at 25 years.  Resolved on 4/2/1992 at 26 years.  Pregnant ([id number]): Onset on 6/1/1989 at 23 years.  Resolved on 3/8/1990 at 24 years.  Pregnant ([id number]): Onset on 5/20/1988 at 22 years.   Resolved on 1989 at 23 years.  Pregnant ([id number]): Onset on 1983 at 17 years.  Resolved on 1984 at 18 years.  Bilateral cataracts ([id number]):  Resolved., Reviewed as documented in chart.   Surgical history:    Cataract Extraction Phacoemulsification (Right) on 2014 at 49 Years.  Comments:  2014 10:21 MOSHE Charles RN, Herman Soler  auto-populated from documented surgical case  Cataract Extraction Phacoemulsification (Left) on 2014 at 48 Years.  Comments:  2014 11:05 MOSHE Ray RN, Luanne Pena  auto-populated from documented surgical case  Appendectomy; (89674) on 2005 at 39 Years.  hernia repair.   x 3.  cyst removal.  Hysterectomy ([id number]).  Hernia ([id number])., Reviewed as documented in chart.   Family history:    Heart disease  Father  Diabetes mellitus type 2  Mother  Brother  Renal failure  Brother  Acute myocardial infarction.  Father  Congestive heart disease.  Mother  , Reviewed as documented in chart.   Social history:    Social & Psychosocial Habits    Alcohol  10/24/2018  Use: Past    Frequency: 1-2 times per month    Employment/School  2018  Status: Unemployed    Home/Environment  2018  Lives with: Alone, Children    Sexual  2019  What is your current gender identity? (Check all that apply) Identifies as female    Substance Use  2014 Risk Assessment: Denies Substance Abuse    10/09/2016  Use: Never    Tobacco  2014 Risk Assessment: Low Risk    2021  Use: Former smoker, quit more    Patient Wants Consult For Cessation Counseling N/A    2021  Use: 4 or less cigarettes(less    Patient Wants Consult For Cessation Counseling N/A    Abuse/Neglect  2019  SHX Any signs of abuse or neglect No    2021  SHX Any signs of abuse or neglect No    2021  SHX Any signs of abuse or neglect No    Feels unsafe at home: No    Safe place to go: Yes  , Reviewed as documented in  chart.   Problem list:    Active Problems (11)  Arthritis   Asthma   Diabetes   GERD (gastroesophageal reflux disease)   Headache   Hypercholesteremia   Hypertension   Knowledge deficit   Numbness of lower limb   Seizures   Tobacco user   .      Physical Examination               Vital Signs      No qualifying data available.   Oxygen saturation.   General:  Alert, no acute distress.    Skin:  Warm, intact.    Head:  Normocephalic, atraumatic.    Eye:  Normal conjunctiva.   Ears, nose, mouth and throat:  Tympanic membranes clear, oral mucosa moist, no pharyngeal erythema or exudate.    Cardiovascular:  Regular rate and rhythm, Normal peripheral perfusion.    Respiratory:  Lungs are clear to auscultation, respirations are non-labored, breath sounds are equal, Symmetrical chest wall expansion.    Chest wall   Gastrointestinal:  Soft, Nontender, Non distended, Guarding: Negative, Rebound: Negative, Signs: None.    Neurological:  Alert and oriented to person, place, time, and situation, normal speech observed.    Lymphatics      Medical Decision Making   Documents reviewed:  Emergency department nurses' notes, emergency department records, prior records.    Orders  Launch Order Profile (Selected)   Inpatient Orders  Completed  Levsin SL 0.125 mg sublingual tablet: 0.125 mg, form: Tab, SL, Now, first dose 12/20/21 11:08:00 CST, stop date 12/20/21 11:08:00 CST, STAT  Lidocaine Viscous 2% mucous membrane solution: 5 mL, form: Soln, Oral, Now, first dose 12/20/21 11:08:00 CST, stop date 12/20/21 11:08:00 CST, STAT  Maalox Antacid with Anti-Gas oral suspension: 30 mL, form: Susp, Oral, Now, first dose 12/20/21 11:08:00 CST, stop date 12/20/21 11:08:00 CST, STAT  Protonix: 20 mg, form: Tab-EC, Oral, Once, first dose 12/20/21 11:09:00 CST, stop date 12/20/21 11:09:00 CST, STAT  Zofran 2 mg/mL injectable solution: 4 mg, form: Injection, IV Push, Once-NOW, first dose 12/20/21 10:20:00 CST, stop date 12/20/21 10:20:00 CST, STAT,  2 L Normal Saline, 1 L lactated ringers.    Electrocardiogram:  Time 12/20/2021 11:43:00, rate 109, no ectopy, EP Interp, The Rhythm is sinus tachycardia.  .    Results review:     No qualifying data available  , Interpretation Abnormal results  Glucose: 620 ( gap :22), K: 5.5, Creatinine: 1.47, GFR: 39, Gave 2L normal saline, will admit for DKA.    Radiology results:  Reported at  12/20/2021 12:19:00, X-ray, abdomen, reviewed radiologist's report, interpretation:  no acute abnormality identified, Rad Results (ST)  < 12 hrs   Accession: [id number]  Order: XR Abdomen Flat and Erect  Report Dt/Tm: 12/20/2021 12:19  Report:   XR Abdomen Flat and Erect     HISTORY: Abdominal Pain     COMPARISON: X-rays dated 12/22/2017     FINDINGS:  There is no free intraperitoneal air. There is a normal colonic stool  burden. There are no dilated loops of small bowel or air-fluid levels  to suggest obstruction. The lung bases are clear.        IMPRESSION:  No acute abnormality identified.      Accession: [id number]  Order: XR Chest 1 View  Report Dt/Tm: 12/20/2021 12:18  Report:   EXAM: XR Chest 1 View  DATE: 12/20/2021 12:17 PM CST  INDICATION: Cough     COMPARISON: Chest radiograph 12/22/2017.     TECHNIQUE: Frontal view of the chest.        FINDINGS:   The cardiomediastinal silhouette is stable in size and contour.  Pulmonary vascularity is within normal limits. There is ill-defined  left basilar opacity concerning for developing infiltrate. No pleural  effusion or pneumothorax.     The imaged osseous structures and soft tissues are without acute  abnormality.        IMPRESSION:  Ill-defined left basilar opacity concerning for developing infiltrate.        .    Radiology results:  Reported at  12/20/2021 12:18:00, X-ray, chest, reviewed radiologist's report, interpretation:  Ill-defined left basilar opacity concerning for developing infiltrate..       Reexamination/ Reevaluation   Course: progressing as expected, well  controlled.      Impression and Plan   Diagnosis   DKA, type 1 ([id number] E10.10)   Lung infiltrate (FYE81-UL R91.8)    Diagnosis   Diagnosis code search   Diagnosis code search     Diagnosis   Diagnosis code search       Calls-Consults   -  12/20/2021 12:01:00 , Medicine, consult.    Plan   Condition: Guarded.    Disposition: Admit time  12/20/2021 12:01:00, Admit to Intensive Care Unit, Patient care was supervised by: Terry YUN, Dr. Neo Lopez was consulted and went to bedside for a face-to-face evaluation with this patient for focused physical exam, medical decision making and final disposition..    Prescriptions   Counseled: Patient, Family, Regarding diagnosis, Regarding diagnostic results, Regarding treatment plan, Patient indicated understanding of instructions.       Addendum      Teaching-Supervisory Addendum-Brief   I participated in the following activities of this patients care: the medical history, the physical exam, medical decision making, the procedure.   I personally performed: supervision of the patient's care, the medical history, the physical exam, the medical decision making.   The case was discussed with: the physician assistant.   Procedures: I performed the procedure.   Evaluation and management service: I agree with the evaluation and management decisions made in this patient's care.   Results interpretation: I agree with the documentation of the study interpretation.   Notes: I, Dr. GERG Currie FACEP, performed a face to face evaluation of this Pt Chasidy Salter  with history of DM, presents with nausea and vomiting after she R/O her insulin needles and not using it for a week  my physical exam reveal mild distress, cough noticed. This case was initially evaluated by KATARZYNA Ramirez under my supervision and I agree with the documentation, procedures and plan. Pt with an elevated anion gap metabolic acidosis with hyperglycemia with Hx of non complience concerning for DKA, also a  possible developing pneumonia for which will admit to ICU. I personally performed the history, physical, imaging review, EKG interpretation , MDM,  critical care procedure, and admission for this patient.     X Critical Care: Pt with high anion gap metabolic acidosis and hyperglycemia concerning for DKA. Insulin drip started, fluid resucitation given and will admit to ICU    Condition: Guarded  Intervention: Aggressive fluid therapy, insulin drip, antibiotics for pneumonia  Response: Adequate  Time: 45 min.

## 2022-05-03 DIAGNOSIS — R94.2 ABNORMAL LUNG SCAN: Primary | ICD-10-CM

## 2022-05-03 NOTE — PROGRESS NOTES
Patient with recent CT - inform the patient that while some of areas of concern that were seen on the last CT in December have resolved,they still suggest another f/u CT in about 3 months - I have ordered this exam and they will contact him to set it up.    Thanks!    Mckenzie Rizo

## 2022-05-04 NOTE — HISTORICAL [facility]ER
This is a historical note converted from [employer]. Formatting and pictures may have been removed.  Please reference Cerkike for original formatting and attached multimedia. Admit and Discharge Dates  Admit Date: 12/20/2021  Discharge Date: 12/22/2021  Physicians  Attending Physician - Marco Antonio YUN, Errol MAGAÑA  Admitting Physician - Marco Antonio YUN, Errol MAGAÑA  Primary Care Physician - No PCP, No  Discharge Diagnosis  1.?Community acquired pneumonia?J18.9  2.?DKA (diabetic ketoacidosis)?E11.10  3.?AMS (altered mental status)?R41.82  4.?Metabolic encephalopathy?G93.41  5.?Hypertension?I10  6.?SUZETTE (acute kidney injury)?N17.9  7.?GERD (gastroesophageal reflux disease)?K21.9  8.?HLD (hyperlipidemia)?E78.5  9.?Seizure disorder?G40.909  10.?Asthma?J45.909  Immunizations  Pneumovax 23  Admission Information  Patient is a 56 year old female with PMH of DM, GERD, HTN, HLD, seizure disorder, tobacco use, asthma, obesity who presents complaining of cough for the past week. Yesterday, patient reports that she started vomiting bile and having abdominal pain with associated brown colored diarrhea. States she is compliant with all her home medicines but ran out of insulin several months ago, however, has a physician follow up visit soon within a week. Reports sick contacts at home that include 5 roommates. Currently accompanied by her . On presentation, XR chest showed left basilar opacity/developing infiltrate. Labs were significant for an anion gap of 22, K of 5.5, , Cr 1.47 and WBC 14.8, Albumin 2.9, BOHB 8.7, 4+ ketones on UA,?pH 7.1 on ABG, HCO3 13. EKG showed sinus tachycardia.?Medicine consulted, will admit to ICU.  Hospital Course  While under care at [facility], Ms. Salter was treated for DKA, AMS, Metabolic Encephalopathy, Community Acquired Pneumonia, and SUZETTE in addition to being managed for chronic HTN, HLD, GERD, Seizures, and Asthma.?Patient was started?DKA protocol and given adequate IVF hydration. She was admitted to  ICU,?completed DKA protocol within?24 hours, then?transitioned to home subq insulin. CXR on admission resulted in left basilar opacity concerning for infiltrate and Rocephin/Azithromycin was started for CAP. Nasopharyngeal swab positive for MRSA, vancomycin coverage added. Renal function improved after adequate hydration. AMS resolved following improvement in HTN, CBGs, and Renal Function. Patient was continued on home AED, held home lisinopril and given Labetalol/Hydralazine for HTN, and Protonix for GERD symptoms. CT thorax w/out contrast completed on day of discharge showing b/l patchy opacities compatible with infection. Radiology recommended f/u chest CT in 3 months to document resolution of enlarged mediastinal lymph nodes which may be reactive in nature. She is being discharged with Abx x5 days for continued resolution of CAP, advised to monitor CBGs closely with strict Diabetes medication compliance, and Clotrimazole for txt of right groin rash  Time Spent on discharge  > 30 minutes  Objective  Vitals & Measurements  T:?36.9? ?C (Oral)? TMIN:?36.6? ?C (Oral)? TMAX:?37.2? ?C (Oral)? HR:?114(Peripheral)? RR:?24? BP:?136/78? SpO2:?95%?  Physical Exam  Gen: pleasant, well-nourished, obese,?well-developed?female, in no acute distress  HEENT: Normocephalic, atraumatic. PERRL. Anicteric  Neck: No JVD. No thyromegaly. No lymphadenopathy.  Heart: RRR, no murmurs, gallops, clicks or rubs.  Lungs:?Mild crackles appreciated left lower?posterior?auscultation. CTAB in remaining lung fields.?Normal work of breathing.  Abd: Soft, non-tender, non-distended and without guarding/rigidity. Normoactive bowel sounds present.  Extremities: 2+ radial and 1+ dorsalis pedal pulses B/L,?no LE edema.  MSK: No obvious deformities. Moves all extremities purposefully.  Neuro: Responds well to commands, answers most questions appropriately. At time drifts from interviewed questions, but is easily re-directable. CN II-XII grossly  intact.  Skin: Warm, dry. Diffuse rash located to right groin, circumferential, with mild excoriation  ?  Labs Last 24 Hours?  ?Chemistry? Hematology/Coagulation?   Sodium Lvl: 136 mmol/L (12/22/21 04:30:00) WBC: 8.5 x10(3)/mcL (12/22/21 04:30:00)   Potassium Lvl: 4 mmol/L (12/22/21 04:30:00) RBC:?3.75 x10(6)/mcL?Low (12/22/21 04:30:00)   Chloride: 103 mmol/L (12/22/21 04:30:00) Hgb:?10.8 gm/dL?Low (12/22/21 04:30:00)   CO2: 24 mmol/L (12/22/21 04:30:00) Hct:?34.8 %?Low (12/22/21 04:30:00)   Calcium Lvl: 9.2 mg/dL (12/22/21 04:30:00) Platelet: 278 x10(3)/mcL (12/22/21 04:30:00)   Glucose Lvl:?157 mg/dL?High (12/22/21 04:30:00) MCV: 92.8 fL (12/22/21 04:30:00)   BUN: 12.5 mg/dL (12/22/21 04:30:00) MCH: 28.8 pg (12/22/21 04:30:00)   Creatinine: 0.73 mg/dL (12/22/21 04:30:00) MCHC: 31 gm/dL (12/22/21 04:30:00)   Est Creat Clearance Ser: 63.78 mL/min (12/22/21 05:30:30) RDW: 13.3 % (12/22/21 04:30:00)   eGFR-AA: >105 (12/22/21 04:30:00) MPV:?10.6 fL?High (12/22/21 04:30:00)   eGFR-JACK:?88 mL/min/1.73 m2?Low (12/22/21 04:30:00) Abs Neut: 5.59 x10(3)/mcL (12/22/21 04:30:00)   Bili Total: 0.2 mg/dL (12/22/21 04:30:00) Neutro Auto: 66 % (12/22/21 04:30:00)   Bili Direct: 0.1 mg/dL (12/22/21 04:30:00) Lymph Auto: 24 % (12/22/21 04:30:00)   Bili Indirect: 0.1 mg/dL (12/22/21 04:30:00) Mono Auto: 8 % (12/22/21 04:30:00)   AST: 12 unit/L (12/22/21 04:30:00) Eos Auto: 1 % (12/22/21 04:30:00)   ALT: 7 unit/L (12/22/21 04:30:00) Abs Eos: 0.1 x10(3)/mcL (12/22/21 04:30:00)   Alk Phos: 68 unit/L (12/22/21 04:30:00) Basophil Auto: 0 % (12/22/21 04:30:00)   Total Protein: 6.8 gm/dL (12/22/21 04:30:00) Abs Neutro: 5.59 x10(3)/mcL (12/22/21 04:30:00)   Albumin Lvl:?2.4 gm/dL?Low (12/22/21 04:30:00) Abs Lymph: 2.1 x10(3)/mcL (12/22/21 04:30:00)   Globulin:?4.4 gm/dL?High (12/22/21 04:30:00) Abs Mono: 0.7 x10(3)/mcL (12/22/21 04:30:00)   A/G Ratio:?0.5 ratio?Low (12/22/21 04:30:00) Abs Baso: 0 x10(3)/mcL (12/22/21 04:30:00)    NRBC%: 0.0  (12/22/21 04:30:00)    IG%: 0 % (12/22/21 04:30:00)    IG#: 0.04 (12/22/21 04:30:00)   Patient Discharge Condition  Patient was clinically, medically, and hemodynamically stable at time of discharge  Discharge Disposition  Ms. Chasidy Salter is being discharged to home.  ?   Activity: Patient may return to normal daily activity as tolerated  Diet: Regular diet, while remaining mindful of diabetic friendly foods  ?   Medications:  - Rx provided for Augmentin 875 mg-125 mg,?Oral, BID, x5 days  - Rx provided for Doxycycline 100 mg, Oral, BID, x5 days  - Rx provided for Clotrimazole topical (clotrimazole 1% topical cream)?1 marnie, TOP, BID  - Rx provided for Insulin glargine (Lantus Solostar Pen) 50 units, Subcutaneous, BID  ?  Discontinue?the below medications?until seen and reevaluated by PCP?  - Esomeprazole 40 mg, Oral, Daily  - Insulin aspart 15 units, Subcutaneous, TIDAC  - Meloxicam 7.5 mg, Oral, Daily, PRN pain, moderate  - Ondansetron (Zofran ODT 4 mg oral tablet, disintegrating)?4 mg, Oral, q12hr, PRN nausea/vomiting  ?  Continue other medications as previously prescribed.  ?   Follow Ups:  - To be seen in [facility] on Monday (12/27/21) for?continued improvement?respiratory status, serum glucose management, and improvement of right groin rash  ?  - Labs drawn prior to discharge, for further evaluation of ams/confusion while hospitalized: Ammonia, TSH, B12, Folate, Syphilis  ?  - The following imaging studies are to be ordered at the [facility] visit: CXR 2 views in 3-4 weeks from [facility] visit for evaluation of pulmonary abnormalities suggesting infectious process vs chronic changes  ?  ?  The above information was discussed with Ms. Salter in clear terms. She was able to repeat the instructions to me in her own words. All questions answered. ED precautions provided   Discharge Medication Reconciliation  Prescribed  amoxicillin-clavulanate (Augmentin 875 mg-125 mg oral tablet)?1 tab(s), Oral,  BID  clotrimazole topical (clotrimazole 1% topical cream)?1 marnie, TOP, BID  doxycycline (doxycycline hyclate 100 mg oral capsule)?100 mg, Oral, BID  insulin glargine (Lantus Solostar Pen 100 units/mL subcutaneous solution)?50 units, Subcutaneous, BID  Continue  FLUoxetine (fluoxetine 10 mg oral capsule)?See Instructions  Misc Prescription?See Instructions  Misc Prescription (BD INSULIN SYRINGES UF 1ML [id number])?See Instructions  Misc Prescription (BD UF1ml ?MINI PEN NEEDLE [id number])?See Instructions  Misc Prescription (GLUCOMETER MACHINE)?See Instructions  Misc Prescription (GLUCOMETER STRIPS AND LANCETS)?See Instructions  Misc Prescription (Insulin syringes)?See Instructions  Misc Prescription (Lancets)?See Instructions  Misc Prescription (ONE TOUCH ULTRA 2 GLUCOMETER)?See Instructions  Misc Prescription (One touch ultra blue TEST STRIPS)?See Instructions  Misc Prescription (Relion Pen Needles)?See Instructions  Misc Prescription (pen needle)?See Instructions  albuterol (Ventolin HFA 90 mcg/inh inhalation aerosol)?180 mcg, INH, q6hr, PRN as needed for wheezing  albuterol (albuterol 2.5 mg/3 mL (0.083%) inhalation solution)?2.5 mg, INH, q6hr, PRN for wheezing  aspirin (aspirin 81 mg oral capsule)?81 mg, Oral, Daily  atorvastatin (Lipitor 40 mg oral tablet)?40 mg, Oral, Daily  cyclobenzaprine (cyclobenzaprine 10 mg oral tablet)?10 mg, Oral, TID  dicyclomine (Bentyl 10 mg oral capsule)?10 mg, Oral, QID, PRN abdominal pain  fluticasone nasal (fluticasone 50 mcg/inh nasal spray)?100 mcg, Nasal, At Bedtime  glipiZIDE (glipiZIDE 10 mg oral tablet)?10 mg, Oral, BID  lisinopril (lisinopril 10 mg oral tablet)?10 mg, Oral, Daily  meloxicam (Mobic 7.5 mg oral tablet)?7.5 mg, Oral, BID, PRN pain, mild  metoclopramide (Reglan 10 mg oral tablet)?10 mg, Oral, QID  omeprazole (omeprazole 40 mg oral DR capsule)?40 mg, Oral, BID  ondansetron (ondansetron 4 mg oral tablet)?See Instructions  phenytoin (Dilantin 100 mg oral capsule,  extended release)?1 cap, Oral, TID  Discontinue  esomeprazole (NexIUM 40 mg oral delayed release capsule)?40 mg, Oral, Daily  insulin aspart (NovoLOG 100 units/mL injectable solution)?15 units, Subcutaneous, TIDAC  insulin glargine (insulin glargine 100 units/mL subcutaneous solution)?See Instructions  meloxicam (Mobic 7.5 mg oral tablet)?7.5 mg, Oral, Daily, PRN pain, moderate  ondansetron (Zofran ODT 4 mg oral tablet, disintegrating)?4 mg, Oral, q12hr, PRN nausea/vomiting  Education and Orders Provided  Diabetic Ketoacidosis  Community-Acquired Pneumonia, Adult, Easy-to-Read  Hypertension, Easy-to-Read  Discharge - 12/22/21 11:32:00 CST, Home, Please discharge patient home after meds to bed, thanks?  Follow up  Keep scheduled appointment  ????Patient to follow up with [facility] and PCP as scheduled  Report to Emergency Department if symptoms return or worsen  Car Seat Challenge  No Qualifying Data      Patient seen and examined,?reviewed with the resident,?agree with?assessment?and?plan. needs to watch severe tight groin tinea infection closely.? will schedule f/u in less than a week in [facility] and is instructed to go to er if adverse change - first sign of adverse change

## 2022-05-04 NOTE — HISTORICAL [facility]
This is a historical note converted from Matthew. Formatting and pictures may have been removed.  Please reference Matthew for original formatting and attached multimedia. Chief Complaint  cough  History of Present Illness  Patient is a 56 year old female with PMH of DM, GERD, HTN, HLD, seizure disorder, tobacco use, asthma, obesity who presents complaining of cough for the past week. Yesterday, patient reports that she started vomiting bile and having abdominal pain with associated brown colored diarrhea. States she is compliant with all her home medicines but ran out of insulin several months ago, however, has a physician follow up visit soon within a week. Reports sick contacts at home that include 5 roommates. Currently accompanied by her . On presentation, XR chest showed left basilar opacity/developing infiltrate. Labs were significant for an anion gap of 22, K of 5.5, , Cr 1.47 and WBC 14.8, Albumin 2.9, BOHB 8.7, 4+ ketones on UA,?pH 7.1 on ABG, HCO3 13. EKG showed sinus tachycardia.?Medicine consulted, will admit to ICU.  ?  Allergies: NKDA  PSH: listed  SH: denies tobacco/alcohol/drug use, lives with roommates  FH: MI, CHF, DM  Meds: listed  Review of Systems  10 point ROS was reviewed  Physical Exam  Vitals & Measurements  T:?37.5? ?C (Oral)? HR:?109(Peripheral)? RR:?18? BP:?180/72? SpO2:?100%? WT:?87.7?kg? BMI:?36.98?  General:?in no acute distress, morbid obese, dishevelled  Eye: PERRLA, EOMI, clear conjunctiva, eyelids normal  HENT:?AT, NC, oropharynx without erythema/exudate, oropharynx and nasal mucosal surfaces moist  Neck: full range of motion, no thyromegaly or lymphadenopathy  Respiratory:?decreased left sided breath sounds, course?to auscultation bilaterally, symmetric expansion b/l  Cardiovascular:?regular rate and rhythm without murmurs, gallops or rubs, no peripheral edema  Gastrointestinal:?soft, non-tender, non-distended with normal bowel sounds, without masses to  palpation  Musculoskeletal:?full range of motion of all extremities/spine without limitation or discomfort  Integumentary: no rashes or skin lesions present  Neurologic: AOx3, drowsy,?no signs of peripheral neurological deficit, motor/sensory function intact  ?  Labs Last 24 Hours?  ?Chemistry? Hematology/Coagulation? Blood Gases?   Sodium Lvl:?132 mmol/L?Low (12/20/21 10:21:00) WBC:?14.8 x10(3)/mcL?High (12/20/21 10:21:00) Sample Harsha: venous (12/20/21 11:59:00)   Potassium Lvl:?5.5 mmol/L?High (12/20/21 10:21:00) RBC: 4.55 x10(6)/mcL (12/20/21 10:21:00) Treatment Harsha: NC (12/20/21 11:59:00)   Chloride:?94 mmol/L?Low (12/20/21 10:21:00) Hgb: 13.1 gm/dL (12/20/21 10:21:00) Site Harsha: Harsha Line (12/20/21 11:59:00)   CO2:?16 mmol/L?Low (12/20/21 10:21:00) Hct: 42.8 % (12/20/21 10:21:00) pH Harsha:?7.1?Low (12/20/21 11:59:00)   Calcium Lvl: 9.6 mg/dL (12/20/21 10:21:00) Platelet: 307 x10(3)/mcL (12/20/21 10:21:00) pO2 Harsha: 62 mmHg (12/20/21 11:59:00)   Magnesium Lvl: 1.9 mg/dL (12/20/21 10:21:00) MCV: 94.1 fL (12/20/21 10:21:00) pCO2 Harsha: 41 mmHg (12/20/21 11:59:00)   Glucose Lvl:?620 mg/dL?Critical (12/20/21 10:21:00) MCH: 28.8 pg (12/20/21 10:21:00) HCO3 Harsha:?13 mmol/L?Low (12/20/21 11:59:00)   BUN: 19.3 mg/dL (12/20/21 10:21:00) MCHC:?30.6 gm/dL?Low (12/20/21 10:21:00) CO2 Totl Harsha:?14?Low (12/20/21 11:59:00)   Creatinine:?1.47 mg/dL?High (12/20/21 10:21:00) RDW: 13.1 % (12/20/21 10:21:00) D Base Harsha:?-16.3?Low (12/20/21 11:59:00)   Est Creat Clearance Ser: 31.67 mL/min (12/20/21 11:12:10) MPV:?11 fL?High (12/20/21 10:21:00) % Sat Harsha: 89.4 % (12/20/21 11:59:00)   eGFR-AA:?47?Low (12/20/21 10:21:00) Abs Neut:?11.44 x10(3)/mcL?High (12/20/21 10:21:00) THB Harsha: 12.9 gm/dL (12/20/21 11:59:00)   eGFR-JACK:?39 mL/min/1.73 m2?Low (12/20/21 10:21:00) Neutro Auto: 78 % (12/20/21 10:21:00) CO Hgb Hasrha:?2.9 %?High (12/20/21 11:59:00)   Bili Total: 0.3 mg/dL (12/20/21 10:21:00) Lymph Auto: 12 % (12/20/21 10:21:00) O2 Hgb Harsha:?86.8  %?High (12/20/21 11:59:00)   Bili Direct: 0.2 mg/dL (12/20/21 10:21:00) Mono Auto: 9 % (12/20/21 10:21:00)    Bili Indirect: 0.1 mg/dL (12/20/21 10:21:00) Eos Auto: 0 % (12/20/21 10:21:00)    AST: 8 unit/L (12/20/21 10:21:00) Abs Eos: 0 x10(3)/mcL (12/20/21 10:21:00)    ALT: 11 unit/L (12/20/21 10:21:00) Basophil Auto: 0 % (12/20/21 10:21:00)    Alk Phos: 85 unit/L (12/20/21 10:21:00) Abs Neutro:?11.44 x10(3)/mcL?High (12/20/21 10:21:00)    Total Protein: 7.6 gm/dL (12/20/21 10:21:00) Abs Lymph: 1.8 x10(3)/mcL (12/20/21 10:21:00)    Albumin Lvl:?2.9 gm/dL?Low (12/20/21 10:21:00) Abs Mono:?1.4 x10(3)/mcL?High (12/20/21 10:21:00)    Globulin:?4.7 gm/dL?High (12/20/21 10:21:00) Abs Baso: 0 x10(3)/mcL (12/20/21 10:21:00)    A/G Ratio:?0.6 ratio?Low (12/20/21 10:21:00) NRBC%: 0.0 (12/20/21 10:21:00)    Phosphorus: 3.8 mg/dL (12/20/21 12:01:00) IG%: 0 % (12/20/21 10:21:00)    Lactic Acid Lvl: 1.5 mmol/L (12/20/21 12:32:00) IG#: 0.08 (12/20/21 10:21:00)    Lipase Lvl: 15 U/L (12/20/21 10:21:00)     BOHB:?8.73 mmol/L?High (12/20/21 12:57:00)     Total CK: 36 U/L (12/20/21 10:21:00)     CK MB: 0.6 ng/mL (12/20/21 10:21:00)     Troponin-I: 0.012 ng/mL (12/20/21 10:21:00)     ?  ?  Accession:?[id number]  Order:?XR Abdomen Flat and Erect  Report Dt/Tm:?12/20/2021 12:19  Report:?  XR Abdomen Flat and Erect  ?  HISTORY: Abdominal Pain  ?  COMPARISON: X-rays dated 12/22/2017  ?  FINDINGS:  There is no free intraperitoneal air. There is a normal colonic stool  burden. There are no dilated loops of small bowel or air-fluid levels  to suggest obstruction. The lung bases are clear.  ?  ?  IMPRESSION:  No acute abnormality identified.  ?  ?  Accession:?[id number]  Order:?XR Chest 1 View  Report Dt/Tm:?12/20/2021 12:18  Report:?  EXAM: XR Chest 1 View  DATE: 12/20/2021 12:17 PM CST  INDICATION: Cough  ?  COMPARISON: Chest radiograph 12/22/2017.  ?  TECHNIQUE: Frontal view of the chest.  ?  ?  FINDINGS:?  The cardiomediastinal  silhouette is stable in size and contour.  Pulmonary vascularity is within normal limits. There is ill-defined  left basilar opacity concerning for developing infiltrate. No pleural  effusion or pneumothorax.  ?  The imaged osseous structures and soft tissues are without acute  abnormality.  ?  ?  IMPRESSION:  Ill-defined left basilar opacity concerning for developing infiltrate.  ?  ?  Assessment/Plan  DKA  AMS 2/2 Metabolic Encephalopathy  AGMA, Gap 22,Hyperkalemia  Hypertensive Urgency  CAP with left basilar infiltrate  Acute pre-renal vs. intra-renal Kidney Injury  GERD, HLD, Seizure disorder, tobacco use, asthma  ?   admitted to ICU, DKA protocol started, received 2 L IVF bolus  started on Rocephin and Azithromycin for CAP, continue  monitor anion gap for < 12 and HCO3 > 20 prior to stopping insulin drip  will transition with 10 U Lantus and start sliding scale insulin once gap closed and HCO3 at goal  continue Dilantin for seizure prophylaxis, level ordered  Labetalol and Hydralazine prn for HTN, holding Lisinopril given Sj  keep NPO for now except meds  DuoNeb q4h prn for SOB/wheezing  Zofran q4h prn for nausea  PPI for GI prophylaxis  SCD for DVT prophylaxis  ?   Problem List/Past Medical History  Ongoing  Arthritis  Asthma  Diabetes  GERD (gastroesophageal reflux disease)  Headache  Hypercholesteremia  Hypertension  Knowledge deficit  Numbness of lower limb  Seizures  Tobacco user  Historical  Bilateral cataracts  Pregnant  Procedure/Surgical History  ANTERIOR CHAMBER INTRAOCULAR LENS (12/09/2014)  Cataract Extraction Phacoemulsification (Right) (12/09/2014)  Extracapsular cataract removal with insertion of intraocular lens prosthesis (1-stage procedure), manual or mechanical technique (eg, irrigation and aspiration or phacoemulsification), complex, requiring devices or techniques not g. (12/09/2014)  Insertion of intraocular lens prosthesis at time of cataract extraction, one-stage  (2014)  Phacoemulsification and aspiration of cataract (2014)  ANTERIOR CHAMBER INTRAOCULAR LENS (2014)  Cataract Extraction Phacoemulsification (Left) (2014)  Extracapsular cataract removal with insertion of intraocular lens prosthesis (1-stage procedure), manual or mechanical technique (eg, irrigation and aspiration or phacoemulsification), complex, requiring devices or techniques not g. (2014)  Insertion of intraocular lens prosthesis at time of cataract extraction, one-stage (2014)  Phacoemulsification and aspiration of cataract (2014)  Appendectomy; (2005)   x 3  cyst removal  Hernia  hernia repair  Hysterectomy   Medications  Inpatient  aspirin 81 mg oral tablet, CHEWABLE, 81 mg= 1 tab(s), Oral, Daily  azithromycin  Dextrose 5% with 0.45% NaCl intravenous solution 1,000 mL, 1000 mL, IV  Dextrose 50% abboject syringe, 25 mL, IV Push, q15min, PRN  Dilantin 100 mg oral capsule, extended release, 1 cap, Oral, TID  fluoxetine 10 mg oral capsule, 10 mg= 1 cap(s), Oral, Daily  Lactated Ringers 1000ml 1,000 mL, 1000 mL, IV  Lipitor 20 mg oral tablet, 40 mg= 2 tab(s), Oral, Daily  Magnesium Sulfate 2gm/50ml IVPB (Premix), 2 gm= 50 mL, IV Piggyback, As Indicated, PRN  Milk of Magnesia, 30 mL, Oral, Daily, PRN  Novolin R infusion additive 100 units + Sodium Chloride 0.9% 100ml 100 mL  Novolin R infusion additive 100 units + Sodium Chloride 0.9% 100ml 100 mL  Protonix, 40 mg= 1 EA, IV Slow, Daily  Rocephin (for IVPB)  Sodium Chloride 0.45% intravenous solution 1,000 mL, 1000 mL, IV  Sodium Chloride 0.9% intravenous solution 1,000 mL, 1000 mL, IV  Sodium Chloride 0.9% intravenous solution 1,000 mL, 1000 mL, IV  sodium phosphate (for IVPB) + Sodium Chloride 0.9% 250ml 250 mL  sodium phosphate (for IVPB) + Sodium Chloride 0.9% 250ml 250 mL  Tylenol, 650 mg= 2 tab(s), Oral, q4hr, PRN  Tylenol 325 mg oral tablet, 650 mg= 2 tab(s), Oral, q4hr, PRN  Zofran, 4 mg= 2 mL, IV  Push, q4hr, PRN  Zofran, 8 mg= 4 mL, IV Push, q4hr, PRN  Home  albuterol 2.5 mg/3 mL (0.083%) inhalation solution, 2.5 mg= 3 mL, INH, q6hr, PRN, 11 refills  aspirin 81 mg oral capsule, 81 mg= 1 cap(s), Oral, Daily  BD INSULIN SYRINGES UF 1ML [id number], See Instructions, 6 refills  BD UF1ml MINI PEN NEEDLE [id number], See Instructions, 6 refills  Bentyl 10 mg oral capsule, 10 mg= 1 cap(s), Oral, QID, PRN  cyclobenzaprine 10 mg oral tablet, 10 mg= 1 tab(s), Oral, TID, 5 refills  Dilantin 100 mg oral capsule, extended release, 1 cap, Oral, TID, 3 refills  fluoxetine 10 mg oral capsule, See Instructions, 11 refills  fluticasone 50 mcg/inh nasal spray, 100 mcg= 2 spray(s), Nasal, At Bedtime, 3 refills  glipiZIDE 10 mg oral tablet, 10 mg= 1 tab(s), Oral, BID, 3 refills  GLUCOMETER MACHINE, See Instructions  GLUCOMETER STRIPS AND LANCETS, See Instructions  insulin glargine 100 units/mL subcutaneous solution, See Instructions, 11 refills  Insulin syringes, See Instructions, 4 refills  Lancets, See Instructions, 6 refills  Lipitor 40 mg oral tablet, 40 mg= 1 tab(s), Oral, Daily, 3 refills  lisinopril 10 mg oral tablet, 10 mg= 1 tab(s), Oral, Daily, 3 refills  Misc Prescription, See Instructions, 6 refills  Mobic 7.5 mg oral tablet, 7.5 mg= 1 tab(s), Oral, Daily, PRN  Mobic 7.5 mg oral tablet, 7.5 mg= 1 tab(s), Oral, BID, PRN, 11 refills  NexIUM 40 mg oral delayed release capsule, 40 mg= 1 cap(s), Oral, Daily, 3 refills  NovoLOG 100 units/mL injectable solution, 15 units, Subcutaneous, TIDAC, 11 refills  omeprazole 40 mg oral DR capsule, 40 mg= 1 cap(s), Oral, BID, 3 refills  ondansetron 4 mg oral tablet, See Instructions, 1 refills  ONE TOUCH ULTRA 2 GLUCOMETER, See Instructions  One touch ultra blue TEST STRIPS, See Instructions, 6 refills  pen needle, See Instructions  Reglan 10 mg oral tablet, 10 mg= 1 tab(s), Oral, QID, 3 refills  Relion Pen Needles, See Instructions, 6 refills  Ventolin HFA 90 mcg/inh inhalation aerosol, 180  mcg= 2 puff(s), INH, q6hr, PRN, 4 refills  Zofran ODT 4 mg oral tablet, disintegrating, 4 mg= 1 tab(s), Oral, q12hr, PRN  Allergies  No Known Medication Allergies  Social History  Abuse/Neglect  No, No, Yes, 12/20/2021  No, 05/23/2021  No, 08/06/2019  Alcohol  Past, 1-2 times per month, 10/24/2018  Employment/School  Unemployed, 07/12/2018  Home/Environment  Lives with Alone, Children., 07/12/2018  Sexual  Gender Identity Identifies as female., 07/03/2019  Substance Use - Denies Substance Abuse, 07/30/2014  Never, 10/09/2016  Tobacco - Low Risk, 12/30/2014  4 or less cigarettes(less than 1/4 pack)/day in last 30 days, N/A, 12/20/2021  Former smoker, quit more than 30 days ago, N/A, 05/23/2021  Family History  Acute myocardial infarction.: Father.  Congestive heart disease.: Mother.  Diabetes mellitus type 2: Mother and Brother.  Heart disease: Father.  Renal failure: Brother.

## 2022-05-04 NOTE — HISTORICAL [facility]
This is a historical note converted from Matthew. Formatting and pictures may have been removed.  Please reference Matthew for original formatting and attached multimedia. History of Present Illness  Patient is a 53-year-old female who presents with symptoms of claudication. Denies changes in her health since her last clinic?visit. HPI as follows:? The patient has a significant medical history?for?diabetes, hypertension, and seizure disorder.? Patient states that she has a one-year history of bilateral leg pain especially walking. ?She states that her legs hurt?all the?time.? She smokes approximately 1 pack every week.? She states that the pain?while walking his undercast. ?She denies any constant for pain. ?She also complains of?lower extremity swelling.  The patient went for ABIs?earlier and had an KEKE value of 0.6 on the right and?1 on the left.  ?   NPO since midnight other tahn dilantin this morning. Continues to smoke cigarettes.  Review of Systems  A 10 point ROS was performed and negative except for above  Physical Exam??  No acute distress  Consultation bilaterally  Abdomen soft, nondistended, nontender  Per clinic note: Dopplerable pulses?in the PT and DP on the left. ?Unable to Doppler the PT or DP on the right.? Was able to Doppler the anterior tibial on the right.  Assessment/Plan  1.?Peripheral vascular disease?I73.9  ???A 53-year-old?female who presents with claudication?and an KEKE 0.6 in the right. Plan for angiogram of RLE today.  ?   Samantha Jackson, HO1  ?  ?  ?  ?   Problem List/Past Medical History  Ongoing  Arthritis  Asthma  Diabetes  GERD (gastroesophageal reflux disease)  Headache  Hypercholesteremia  Hypertension  Knowledge deficit  Numbness of lower limb  Seizures  Tobacco user  Historical  Bilateral cataracts  Pregnant  Pregnant  Pregnant  Pregnant  Procedure/Surgical History  ANTERIOR CHAMBER INTRAOCULAR LENS (12/09/2014)  Cataract Extraction Phacoemulsification (Right)  (2014)  Extracapsular cataract removal with insertion of intraocular lens prosthesis (1-stage procedure), manual or mechanical technique (eg, irrigation and aspiration or phacoemulsification), complex, requiring devices or techniques not g. (2014)  Insertion of intraocular lens prosthesis at time of cataract extraction, one-stage (2014)  Phacoemulsification and aspiration of cataract (2014)  ANTERIOR CHAMBER INTRAOCULAR LENS (2014)  Cataract Extraction Phacoemulsification (Left) (2014)  Extracapsular cataract removal with insertion of intraocular lens prosthesis (1-stage procedure), manual or mechanical technique (eg, irrigation and aspiration or phacoemulsification), complex, requiring devices or techniques not g. (2014)  Insertion of intraocular lens prosthesis at time of cataract extraction, one-stage (2014)  Phacoemulsification and aspiration of cataract (2014)  Appendectomy;   x 3  cyst removal  Hernia  hernia repair  Hysterectomy   Medications  Inpatient  No active inpatient medications  Home  albuterol 2.5 mg/3 mL (0.083%) inhalation solution, 2.5 mg= 3 mL, INH, q6hr, PRN  Basaglar KwikPen 100 units/mL subcutaneous solution, See Instructions, 11 refills,? ?Not taking: Last Dose Date/Time Unknown  BD UF MINI PEN NEEDLE [id number], See Instructions  Bentyl 10 mg oral capsule, 10 mg= 1 cap(s), Oral, QID, PRN,? ?Not taking: Last Dose Date/Time Unknown  cyclobenzaprine 10 mg oral tablet, See Instructions, 1 refills,? ?Not taking: Last Dose Date/Time Unknown  Dilantin 100 mg oral capsule, extended release, 1 cap, Oral, TID, 11 refills  fluoxetine 10 mg oral capsule, See Instructions,? ?Not taking  fluticasone 50 mcg/inh nasal spray, 100 mcg= 2 spray(s), Nasal, At Bedtime,? ?Not taking: Last Dose Date/Time Unknown  glipiZIDE 10 mg oral tablet, 10 mg= 1 tab(s), Oral, BID, 3 refills  GLUCOMETER MACHINE, See Instructions  GLUCOMETER STRIPS AND LANCETS, See  Instructions  insulin glargine 100 units/mL subcutaneous solution, See Instructions, 11 refills,? ?Not taking  Insulin syringes, See Instructions, 4 refills  Lancets, See Instructions, 6 refills  lisinopril 10 mg oral tablet, 10 mg= 1 tab(s), Oral, Daily, 3 refills  loratadine 10 mg oral capsule, 10 mg= 1 cap(s), Oral, Daily  Mobic 7.5 mg oral tablet, 7.5 mg= 1 tab(s), Oral, BID, PRN, 11 refills  NovoLOG 100 units/mL injectable solution, 10 units, Subcutaneous, TIDAC, 11 refills  omeprazole 40 mg oral DR capsule, 40 mg= 1 cap(s), Oral, BID, 11 refills,? ?Not taking  ondansetron 4 mg oral tablet, See Instructions, 1 refills  ONE TOUCH ULTRA 2 GLUCOMETER, See Instructions  pen needle, See Instructions  Promethazine DM 6.25 mg-15 mg/5 mL oral syrup, 5 mL, Oral, q6hr, PRN  Reglan 10 mg oral tablet, 10 mg= 1 tab(s), Oral, QID, 3 refills  Relion Pen Needles, See Instructions, 6 refills  TEST STRIPS, See Instructions, 6 refills  Zithromax 250 mg oral tablet, 1 packet(s), Oral, Daily,? ?Not taking  Allergies  No Known Medication Allergies  Social History  Abuse/Neglect  No, 08/06/2019  No, No, Yes, 06/21/2019  Alcohol  Past, 1-2 times per month, 10/24/2018  Employment/School  Unemployed, 07/12/2018  Home/Environment  Lives with Alone, Children., 07/12/2018  Sexual  Gender Identity Identifies as female., 07/03/2019  Substance Use - Denies Substance Abuse, 07/30/2014  Never, 10/09/2016  Tobacco - Low Risk, 12/30/2014  4 or less cigarettes(less than 1/4 pack)/day in last 30 days, N/A, 08/06/2019  Family History  Acute myocardial infarction.: Father.  Congestive heart disease.: Mother.  Diabetes mellitus type 2: Mother and Brother.  Heart disease: Father.  Renal failure: Brother.

## 2022-05-11 RX ORDER — INSULIN PUMP SYRINGE, 3 ML
EACH MISCELLANEOUS
COMMUNITY
Start: 2022-01-07

## 2022-05-23 ENCOUNTER — TELEPHONE (OUTPATIENT)
Dept: INTERNAL MEDICINE | Facility: CLINIC | Age: 57
End: 2022-05-23
Payer: MEDICAID

## 2022-05-23 RX ORDER — FLUTICASONE PROPIONATE 50 MCG
2 SPRAY, SUSPENSION (ML) NASAL NIGHTLY
Qty: 16 G | Refills: 2 | Status: SHIPPED | OUTPATIENT
Start: 2022-05-23

## 2022-05-23 RX ORDER — FLUTICASONE PROPIONATE 50 MCG
2 SPRAY, SUSPENSION (ML) NASAL NIGHTLY
COMMUNITY
Start: 2022-04-05 | End: 2022-05-23 | Stop reason: SDUPTHER

## 2022-05-23 RX ORDER — CYCLOBENZAPRINE HCL 10 MG
10 TABLET ORAL 3 TIMES DAILY
COMMUNITY
Start: 2022-04-05 | End: 2022-05-23 | Stop reason: SDUPTHER

## 2022-05-23 RX ORDER — CYCLOBENZAPRINE HCL 10 MG
10 TABLET ORAL 3 TIMES DAILY
Qty: 30 TABLET | Refills: 1 | Status: SHIPPED | OUTPATIENT
Start: 2022-05-23 | End: 2022-10-19 | Stop reason: SDUPTHER

## 2022-05-23 NOTE — TELEPHONE ENCOUNTER
Last ov: 2/14/22  Next ov: no future appts scheduled. Sent msg to [place] to schedule.     No shows:

## 2022-05-30 ENCOUNTER — TELEPHONE (OUTPATIENT)
Dept: INTERNAL MEDICINE | Facility: CLINIC | Age: 57
End: 2022-05-30
Payer: MEDICAID

## 2022-05-30 NOTE — TELEPHONE ENCOUNTER
Ms. Rankin,    The patient called requested an appt due to her legs. After review in Matthew, at our LOV I February we noted some circulation issues in her legs and I referred her to [facility] for evaluation; I contacted that clinic today and while I am unsure why she had not been scheduled yet (It may be possibly due to our conversation to Epic) they will be contacting her to schedule an appt.     There is no need for the appt with me this week and should be canceled as this is the issue and the clinic she needs to see in regards, I do apology for the delay however.    Thanks.

## 2022-05-31 DIAGNOSIS — Z01.419 ROUTINE GYNECOLOGICAL EXAMINATION: Primary | ICD-10-CM

## 2022-05-31 RX ORDER — INSULIN GLARGINE 100 [IU]/ML
50 INJECTION, SOLUTION SUBCUTANEOUS 2 TIMES DAILY
COMMUNITY
Start: 2022-03-09 | End: 2022-05-31 | Stop reason: SDUPTHER

## 2022-05-31 RX ORDER — INSULIN GLARGINE 100 [IU]/ML
50 INJECTION, SOLUTION SUBCUTANEOUS 2 TIMES DAILY
Qty: 15 ML | Refills: 1 | Status: SHIPPED | OUTPATIENT
Start: 2022-05-31 | End: 2022-08-25

## 2022-07-07 DIAGNOSIS — Z79.4 TYPE 2 DIABETES MELLITUS WITHOUT COMPLICATION, WITH LONG-TERM CURRENT USE OF INSULIN: Primary | ICD-10-CM

## 2022-07-07 DIAGNOSIS — E11.9 TYPE 2 DIABETES MELLITUS WITHOUT COMPLICATION, WITH LONG-TERM CURRENT USE OF INSULIN: Primary | ICD-10-CM

## 2022-07-15 PROBLEM — M19.90 ARTHRITIS: Status: ACTIVE | Noted: 2022-07-15

## 2022-07-15 PROBLEM — K21.9 GASTROESOPHAGEAL REFLUX DISEASE: Status: ACTIVE | Noted: 2022-07-15

## 2022-07-15 PROBLEM — J45.909 ASTHMA: Status: ACTIVE | Noted: 2022-07-15

## 2022-07-15 PROBLEM — I10 HYPERTENSION: Chronic | Status: ACTIVE | Noted: 2022-07-15

## 2022-07-15 PROBLEM — I10 HYPERTENSION: Status: ACTIVE | Noted: 2022-07-15

## 2022-07-15 PROBLEM — R51.9 HEADACHE: Status: ACTIVE | Noted: 2022-07-15

## 2022-07-15 PROBLEM — N18.31 STAGE 3A CHRONIC KIDNEY DISEASE: Status: ACTIVE | Noted: 2022-07-15

## 2022-07-15 PROBLEM — E78.5 HYPERLIPIDEMIA: Status: ACTIVE | Noted: 2022-07-15

## 2022-07-15 PROBLEM — R56.9 SEIZURE: Status: ACTIVE | Noted: 2022-07-15

## 2022-07-15 PROBLEM — R56.9 SEIZURE: Chronic | Status: ACTIVE | Noted: 2022-07-15

## 2022-08-08 DIAGNOSIS — Z79.4 TYPE 2 DIABETES MELLITUS WITHOUT COMPLICATION, WITH LONG-TERM CURRENT USE OF INSULIN: ICD-10-CM

## 2022-08-08 DIAGNOSIS — E11.9 TYPE 2 DIABETES MELLITUS WITHOUT COMPLICATION, WITH LONG-TERM CURRENT USE OF INSULIN: ICD-10-CM

## 2022-08-08 RX ORDER — CYCLOBENZAPRINE HCL 10 MG
10 TABLET ORAL 3 TIMES DAILY
Qty: 30 TABLET | Refills: 1 | OUTPATIENT
Start: 2022-08-08

## 2022-08-08 NOTE — TELEPHONE ENCOUNTER
Good afternoon! Original Rx you signed didn't go through because it was set to print. I fixed the issue so that now it'll go straight to pt pharmacy.

## 2022-08-29 DIAGNOSIS — Z79.4 TYPE 2 DIABETES MELLITUS WITH STAGE 3A CHRONIC KIDNEY DISEASE, WITH LONG-TERM CURRENT USE OF INSULIN: Primary | ICD-10-CM

## 2022-08-29 DIAGNOSIS — N18.31 TYPE 2 DIABETES MELLITUS WITH STAGE 3A CHRONIC KIDNEY DISEASE, WITH LONG-TERM CURRENT USE OF INSULIN: Primary | ICD-10-CM

## 2022-08-29 DIAGNOSIS — E78.2 MIXED HYPERLIPIDEMIA: ICD-10-CM

## 2022-08-29 DIAGNOSIS — E11.22 TYPE 2 DIABETES MELLITUS WITH STAGE 3A CHRONIC KIDNEY DISEASE, WITH LONG-TERM CURRENT USE OF INSULIN: Primary | ICD-10-CM

## 2022-08-29 DIAGNOSIS — B37.2 SKIN YEAST INFECTION: Primary | ICD-10-CM

## 2022-08-29 RX ORDER — CLOTRIMAZOLE AND BETAMETHASONE DIPROPIONATE 10; .64 MG/G; MG/G
CREAM TOPICAL 2 TIMES DAILY
Qty: 15 G | Refills: 6 | Status: SHIPPED | OUTPATIENT
Start: 2022-08-29 | End: 2022-09-14 | Stop reason: SDUPTHER

## 2022-09-14 ENCOUNTER — OFFICE VISIT (OUTPATIENT)
Dept: INTERNAL MEDICINE | Facility: CLINIC | Age: 57
End: 2022-09-14
Payer: MEDICAID

## 2022-09-14 DIAGNOSIS — Z00.00 WELLNESS EXAMINATION: ICD-10-CM

## 2022-09-14 DIAGNOSIS — B37.2 SKIN YEAST INFECTION: ICD-10-CM

## 2022-09-14 DIAGNOSIS — F41.8 DEPRESSION WITH ANXIETY: ICD-10-CM

## 2022-09-14 DIAGNOSIS — Z79.4 TYPE 2 DIABETES MELLITUS WITH STAGE 3A CHRONIC KIDNEY DISEASE, WITH LONG-TERM CURRENT USE OF INSULIN: Primary | ICD-10-CM

## 2022-09-14 DIAGNOSIS — E11.22 TYPE 2 DIABETES MELLITUS WITH STAGE 3A CHRONIC KIDNEY DISEASE, WITH LONG-TERM CURRENT USE OF INSULIN: Primary | ICD-10-CM

## 2022-09-14 DIAGNOSIS — I10 PRIMARY HYPERTENSION: Chronic | ICD-10-CM

## 2022-09-14 DIAGNOSIS — N18.31 TYPE 2 DIABETES MELLITUS WITH STAGE 3A CHRONIC KIDNEY DISEASE, WITH LONG-TERM CURRENT USE OF INSULIN: Primary | ICD-10-CM

## 2022-09-14 DIAGNOSIS — Z23 IMMUNIZATION DUE: ICD-10-CM

## 2022-09-14 DIAGNOSIS — E78.2 MIXED HYPERLIPIDEMIA: ICD-10-CM

## 2022-09-14 DIAGNOSIS — F17.210 CIGARETTE NICOTINE DEPENDENCE WITHOUT COMPLICATION: ICD-10-CM

## 2022-09-14 DIAGNOSIS — K21.9 GASTROESOPHAGEAL REFLUX DISEASE, UNSPECIFIED WHETHER ESOPHAGITIS PRESENT: ICD-10-CM

## 2022-09-14 DIAGNOSIS — Z12.31 ENCOUNTER FOR SCREENING MAMMOGRAM FOR MALIGNANT NEOPLASM OF BREAST: ICD-10-CM

## 2022-09-14 PROBLEM — R59.0 MEDIASTINAL LYMPHADENOPATHY: Status: ACTIVE | Noted: 2022-09-14

## 2022-09-14 PROCEDURE — 99406 PR TOBACCO USE CESSATION INTERMEDIATE 3-10 MINUTES: ICD-10-PCS | Mod: 95,,, | Performed by: NURSE PRACTITIONER

## 2022-09-14 PROCEDURE — 1160F RVW MEDS BY RX/DR IN RCRD: CPT | Mod: CPTII,95,, | Performed by: NURSE PRACTITIONER

## 2022-09-14 PROCEDURE — 99214 PR OFFICE/OUTPT VISIT, EST, LEVL IV, 30-39 MIN: ICD-10-PCS | Mod: 95,25,, | Performed by: NURSE PRACTITIONER

## 2022-09-14 PROCEDURE — 1160F PR REVIEW ALL MEDS BY PRESCRIBER/CLIN PHARMACIST DOCUMENTED: ICD-10-PCS | Mod: CPTII,95,, | Performed by: NURSE PRACTITIONER

## 2022-09-14 PROCEDURE — 3066F NEPHROPATHY DOC TX: CPT | Mod: CPTII,95,, | Performed by: NURSE PRACTITIONER

## 2022-09-14 PROCEDURE — 3062F POS MACROALBUMINURIA REV: CPT | Mod: CPTII,95,, | Performed by: NURSE PRACTITIONER

## 2022-09-14 PROCEDURE — 99406 BEHAV CHNG SMOKING 3-10 MIN: CPT | Mod: 95,,, | Performed by: NURSE PRACTITIONER

## 2022-09-14 PROCEDURE — 99214 OFFICE O/P EST MOD 30 MIN: CPT | Mod: 95,25,, | Performed by: NURSE PRACTITIONER

## 2022-09-14 PROCEDURE — 3066F PR DOCUMENTATION OF TREATMENT FOR NEPHROPATHY: ICD-10-PCS | Mod: CPTII,95,, | Performed by: NURSE PRACTITIONER

## 2022-09-14 PROCEDURE — 1159F MED LIST DOCD IN RCRD: CPT | Mod: CPTII,95,, | Performed by: NURSE PRACTITIONER

## 2022-09-14 PROCEDURE — 1159F PR MEDICATION LIST DOCUMENTED IN MEDICAL RECORD: ICD-10-PCS | Mod: CPTII,95,, | Performed by: NURSE PRACTITIONER

## 2022-09-14 PROCEDURE — 4010F PR ACE/ARB THEARPY RXD/TAKEN: ICD-10-PCS | Mod: CPTII,95,, | Performed by: NURSE PRACTITIONER

## 2022-09-14 PROCEDURE — 3062F PR POS MACROALBUMINURIA RESULT DOCUMENTED/REVIEW: ICD-10-PCS | Mod: CPTII,95,, | Performed by: NURSE PRACTITIONER

## 2022-09-14 PROCEDURE — 4010F ACE/ARB THERAPY RXD/TAKEN: CPT | Mod: CPTII,95,, | Performed by: NURSE PRACTITIONER

## 2022-09-14 RX ORDER — CLOTRIMAZOLE AND BETAMETHASONE DIPROPIONATE 10; .64 MG/G; MG/G
CREAM TOPICAL 2 TIMES DAILY
Qty: 15 G | Refills: 6 | Status: SHIPPED | OUTPATIENT
Start: 2022-09-14

## 2022-09-14 RX ORDER — ALBUTEROL SULFATE 0.83 MG/ML
2.5 SOLUTION RESPIRATORY (INHALATION)
COMMUNITY
Start: 2022-01-03

## 2022-09-14 RX ORDER — METFORMIN HYDROCHLORIDE 500 MG/1
500 TABLET ORAL
COMMUNITY
Start: 2022-02-02 | End: 2022-09-14 | Stop reason: SDUPTHER

## 2022-09-14 RX ORDER — FLUOXETINE 10 MG/1
10 CAPSULE ORAL DAILY
COMMUNITY
Start: 2022-08-04 | End: 2022-09-14 | Stop reason: SDUPTHER

## 2022-09-14 RX ORDER — ATORVASTATIN CALCIUM 40 MG/1
40 TABLET, FILM COATED ORAL DAILY
COMMUNITY
Start: 2022-07-05 | End: 2022-09-14 | Stop reason: SDUPTHER

## 2022-09-14 RX ORDER — OMEPRAZOLE 40 MG/1
40 CAPSULE, DELAYED RELEASE ORAL 2 TIMES DAILY
Qty: 180 CAPSULE | Refills: 1 | Status: SHIPPED | OUTPATIENT
Start: 2022-09-14 | End: 2023-06-20 | Stop reason: SDUPTHER

## 2022-09-14 RX ORDER — METFORMIN HYDROCHLORIDE 500 MG/1
500 TABLET ORAL 2 TIMES DAILY WITH MEALS
Qty: 180 TABLET | Refills: 0 | Status: SHIPPED | OUTPATIENT
Start: 2022-09-14 | End: 2022-12-16

## 2022-09-14 RX ORDER — FLUOXETINE 10 MG/1
10 CAPSULE ORAL DAILY
Qty: 90 CAPSULE | Refills: 1 | Status: SHIPPED | OUTPATIENT
Start: 2022-09-14 | End: 2023-06-20 | Stop reason: SDUPTHER

## 2022-09-14 RX ORDER — PEN NEEDLE, DIABETIC 31 GX5/16"
NEEDLE, DISPOSABLE MISCELLANEOUS
Qty: 100 EACH | Refills: 6 | Status: SHIPPED | OUTPATIENT
Start: 2022-09-14

## 2022-09-14 RX ORDER — OMEPRAZOLE 40 MG/1
40 CAPSULE, DELAYED RELEASE ORAL 2 TIMES DAILY
COMMUNITY
Start: 2022-08-31 | End: 2022-09-14 | Stop reason: SDUPTHER

## 2022-09-14 RX ORDER — METOCLOPRAMIDE 10 MG/1
10 TABLET ORAL
COMMUNITY
Start: 2022-02-02

## 2022-09-14 RX ORDER — GLIPIZIDE 10 MG/1
10 TABLET ORAL 2 TIMES DAILY
Qty: 180 TABLET | Refills: 0 | Status: SHIPPED | OUTPATIENT
Start: 2022-09-14 | End: 2023-06-20 | Stop reason: SDUPTHER

## 2022-09-14 RX ORDER — INSULIN GLARGINE 100 [IU]/ML
50 INJECTION, SOLUTION SUBCUTANEOUS 2 TIMES DAILY
Qty: 15 EACH | Refills: 2 | Status: SHIPPED | OUTPATIENT
Start: 2022-09-14 | End: 2023-02-01

## 2022-09-14 RX ORDER — ALBUTEROL SULFATE 90 UG/1
2 AEROSOL, METERED RESPIRATORY (INHALATION) EVERY 6 HOURS PRN
COMMUNITY
Start: 2022-04-01 | End: 2023-01-03 | Stop reason: SDUPTHER

## 2022-09-14 RX ORDER — LISINOPRIL 10 MG/1
10 TABLET ORAL DAILY
COMMUNITY
Start: 2022-07-05 | End: 2022-09-14 | Stop reason: SDUPTHER

## 2022-09-14 RX ORDER — PEN NEEDLE, DIABETIC 31 GX5/16"
NEEDLE, DISPOSABLE MISCELLANEOUS 2 TIMES DAILY
COMMUNITY
Start: 2022-07-05 | End: 2022-09-14 | Stop reason: SDUPTHER

## 2022-09-14 RX ORDER — LISINOPRIL 10 MG/1
10 TABLET ORAL DAILY
Qty: 90 TABLET | Refills: 1 | Status: SHIPPED | OUTPATIENT
Start: 2022-09-14 | End: 2023-06-20 | Stop reason: SDUPTHER

## 2022-09-14 RX ORDER — ATORVASTATIN CALCIUM 40 MG/1
40 TABLET, FILM COATED ORAL DAILY
Qty: 90 TABLET | Refills: 0 | Status: SHIPPED | OUTPATIENT
Start: 2022-09-14 | End: 2023-06-20 | Stop reason: SDUPTHER

## 2022-09-14 RX ORDER — PHENYTOIN SODIUM 100 MG/1
100 CAPSULE, EXTENDED RELEASE ORAL 3 TIMES DAILY
COMMUNITY
Start: 2022-07-05 | End: 2023-06-20 | Stop reason: SDUPTHER

## 2022-09-14 RX ORDER — GLIPIZIDE 10 MG/1
10 TABLET ORAL 2 TIMES DAILY
COMMUNITY
Start: 2022-07-05 | End: 2022-09-14 | Stop reason: SDUPTHER

## 2022-09-14 RX ORDER — ONDANSETRON 4 MG/1
TABLET, FILM COATED ORAL
COMMUNITY
Start: 2022-01-03

## 2022-09-14 RX ORDER — CLOTRIMAZOLE 1 %
CREAM (GRAM) TOPICAL
COMMUNITY
Start: 2022-02-02

## 2022-09-14 NOTE — PROGRESS NOTES
"  MERCEDES Garcia   [facility]  [address] [address] [facility] [address]      PATIENT NAME: Chasidy Salter  : 1965  DATE: 22  MRN: [id number]      Billing Provider: MERCEDES Garcia  Level of Service: ID OFFICE/OUTPT VISIT, EST, LEVL IV, 30-39 MIN  Patient PCP Information       Provider PCP Type    MERCEDES Garcia General            Reason for Visit / Chief Complaint: Follow-up (La review / med refills and CBG testing supplies )       History of Present Illness / Problem Focused Workflow     Chasidy Salter presents to the clinic with Follow-up (La review / med refills and CBG testing supplies )     56-year-old WF here today for routine f/u. PMH of T2DM, GERD, CKD, HTN,PVD,  HLD, seizure disorder, asthma, and tobacco abuse (quit 3 months ago). Referral to [facility] accepted, appt pending.    Cervical Cancer Screening - [facility] Referral  Breast Cancer Screening - 02/15/22 MMG (hx abnormal)  Colon Cancer Screening - 22 FIT Ordered  Osteoporosis Screening - 22 Vitamin D Level  Diabetic Screening - 22 Fundal Exam . 22 Foot Exam. 22 LAUREN  Lung Cancer Screening - 22    Today's Visit:  Pt here today for routine 6 month f/u with labs completed, labs reviewed and discussed with no questions or concerns at this time. Noted increased HgB A1C and increased FLP increased as well. Pt reports compliance with all medications. The pt reports she checks her blood sugars regularly and there "good", "better all the time", when asked her numbers she reports in the low 50's in the morning? The pt reports her diet has been perfect as well. She is agreeable to f/u in a few months and she will bring in all of her medicines, agreeable to Diabetes Education referral and Endo referral as well. Denies any other acute issues today.   Pt due for 2nd shingles vaccine and Fundal exam, will schedule a nurse visit to complete.   Denies chest " pain, shortness of breath, cough, fever, headache, dizziness, weakness, abdominal pain, nausea, vomiting, diarrhea, constipation, black/bloody stools, unplanned weight loss, night sweats, changes in urinary patterns, burning/odor with urination, depression, anxiety, and SI/HI.         Review of Systems     Review of Systems    Medical / Social / Family History     Past Medical History:   Diagnosis Date    Diabetes mellitus     Hypertension        Past Surgical History:   Procedure Laterality Date    APPENDECTOMY      CATARACT EXTRACTION Bilateral      SECTION      CYST REMOVAL      HERNIA REPAIR      HYSTERECTOMY         Social History  Ms.  reports that she has been smoking cigarettes. She has been smoking an average of .5 packs per day. She has never used smokeless tobacco. She reports that she does not drink alcohol and does not use drugs.    Family History  Ms.'s family history includes Diabetes in her mother; Heart disease in her father; Kidney disease in her brother.    Medications and Allergies     Medications  Medication List with Changes/Refills   Current Medications    ALBUTEROL (PROVENTIL) 2.5 MG /3 ML (0.083 %) NEBULIZER SOLUTION    Inhale 2.5 mg into the lungs.    ALBUTEROL (PROVENTIL/VENTOLIN HFA) 90 MCG/ACTUATION INHALER    Inhale 2 puffs into the lungs every 6 (six) hours as needed.    BLOOD-GLUCOSE METER KIT      One Touch Ultra Glucometer, See Instructions, Check CBGs at least once per day  Dx: DM Dx Code: E11.9 Length of Use: 99 month, # 1 EA, 0 Refill(s), Pharmacy: [facility]/pharmacy #[id number], 154, cm, Height/Length Dosing, 21 23:17:00 CST, 87.7, kg, Weight...    CLOTRIMAZOLE (LOTRIMIN) 1 % CREAM    Apply topically.    CYCLOBENZAPRINE (FLEXERIL) 10 MG TABLET    Take 1 tablet (10 mg total) by mouth 3 (three) times daily. As needed for muscle spasms    FLUTICASONE PROPIONATE (FLONASE) 50 MCG/ACTUATION NASAL SPRAY    2 sprays (100 mcg total) by Each Nostril route nightly.    METOCLOPRAMIDE HCL  "(REGLAN) 10 MG TABLET    Take 10 mg by mouth.    NEBULIZER ACCESSORIES MISC      Nebulizer Tubing Kit, See Instructions, For use with Albuterol as needed Dx. J45.909, # 1 EA, 5 Refill(s)    ONDANSETRON (ZOFRAN) 4 MG TABLET      See Instructions, TAKE 1 TABLET BY MOUTH EVERY 8 HOURS AS NEEDED FOR  NAUSEA/VOMITING, # 90 tab(s), 1 Refill(s), Pharmacy: [facility]/pharmacy #[id number], 154, cm, Height/Length Dosing, 12/20/21 23:17:00 CST, 87.7, kg, Weight Dosing, 12/20/21 23:17:00 CST    PHENYTOIN (DILANTIN) 100 MG ER CAPSULE    Take 100 mg by mouth 3 (three) times daily.   Changed and/or Refilled Medications    Modified Medication Previous Medication    ATORVASTATIN (LIPITOR) 40 MG TABLET atorvastatin (LIPITOR) 40 MG tablet       Take 1 tablet (40 mg total) by mouth once daily. For High Cholesterol    Take 40 mg by mouth once daily.    BD ULTRA-FINE JAKI PEN NEEDLE 32 GAUGE X 5/32" NDLE BD ULTRA-FINE JAKI PEN NEEDLE 32 gauge x 5/32" Ndle       For BID Insulin Administration Dx. E11.9    Inject into the skin 2 (two) times daily.    BLOOD SUGAR DIAGNOSTIC STRP blood sugar diagnostic Strp       Check CBGs at least once per day    DX:DMII,  E11.9   Length of Use: 99 months    Check CBGs at least once per day    DX:DMII,  E11.9   Length of Use: 99 months    CLOTRIMAZOLE-BETAMETHASONE 1-0.05% (LOTRISONE) CREAM clotrimazole-betamethasone 1-0.05% (LOTRISONE) cream       Apply topically 2 (two) times daily. To rash on chest.    Apply topically 2 (two) times daily. To rash on chest.    FLUOXETINE 10 MG CAPSULE FLUoxetine 10 MG capsule       Take 1 capsule (10 mg total) by mouth once daily. For Mood    Take 10 mg by mouth once daily.    GLIPIZIDE (GLUCOTROL) 10 MG TABLET glipiZIDE (GLUCOTROL) 10 MG tablet       Take 1 tablet (10 mg total) by mouth 2 (two) times daily. For Diabetes    Take 10 mg by mouth 2 (two) times daily.    LANCETS 17 GAUGE MISC lancets 17 gauge Misc       For BID Glucose Checks ICD E11.9      Lancets, See Instructions, Use " to check blood glucose twice daily. Dx. Code E11.9, # 200 EA, 6 Refill(s), Pharmacy: [facility]/pharmacy #[id number], 154, cm, Height/Length Dosing, 12/20/21 23:17:00 CST, 87.7, kg, Weight Dosing, 12/20/21 23:17:00 CST    LANTUS SOLOSTAR U-100 INSULIN GLARGINE 100 UNITS/ML SUBQ PEN LANTUS SOLOSTAR U-100 INSULIN glargine 100 units/mL SubQ pen       Inject 50 Units into the skin 2 (two) times a day.    INJECT 50 UNITS INTO THE SKIN 2 (TWO) TIMES A DAY.    LISINOPRIL 10 MG TABLET lisinopriL 10 MG tablet       Take 1 tablet (10 mg total) by mouth once daily. For High Blood Pressure    Take 10 mg by mouth once daily.    METFORMIN (GLUCOPHAGE) 500 MG TABLET metFORMIN (GLUCOPHAGE) 500 MG tablet       Take 1 tablet (500 mg total) by mouth 2 (two) times daily with meals. For Diabetes    Take 500 mg by mouth.    OMEPRAZOLE (PRILOSEC) 40 MG CAPSULE omeprazole (PRILOSEC) 40 MG capsule       Take 1 capsule (40 mg total) by mouth 2 (two) times daily. For Gerd    Take 40 mg by mouth 2 (two) times daily.         Allergies  Review of patient's allergies indicates:  No Known Allergies    Physical Examination   There were no vitals filed for this visit.  Physical Exam      Results     Lab Results   Component Value Date    WBC 8.2 08/29/2022    RBC 4.15 (L) 08/29/2022    HGB 11.7 (L) 08/29/2022    HCT 37.5 08/29/2022    MCV 90.4 08/29/2022    MCH 28.2 08/29/2022    MCHC 31.2 (L) 08/29/2022    RDW 13.0 08/29/2022     (H) 08/29/2022    MPV 10.1 08/29/2022     Sodium Level   Date Value Ref Range Status   08/29/2022 143 136 - 145 mmol/L Final     Potassium Level   Date Value Ref Range Status   08/29/2022 4.9 3.5 - 5.1 mmol/L Final     Carbon Dioxide   Date Value Ref Range Status   08/29/2022 27 22 - 29 mmol/L Final     Blood Urea Nitrogen   Date Value Ref Range Status   08/29/2022 16.8 9.8 - 20.1 mg/dL Final     Creatinine   Date Value Ref Range Status   08/29/2022 0.75 0.55 - 1.02 mg/dL Final     Calcium Level Total   Date Value Ref Range  Status   08/29/2022 9.3 8.4 - 10.2 mg/dL Final     Albumin Level   Date Value Ref Range Status   08/29/2022 3.0 (L) 3.5 - 5.0 gm/dL Final     Bilirubin Total   Date Value Ref Range Status   08/29/2022 0.2 <=1.5 mg/dL Final     Alkaline Phosphatase   Date Value Ref Range Status   08/29/2022 110 40 - 150 unit/L Final     Aspartate Aminotransferase   Date Value Ref Range Status   08/29/2022 27 5 - 34 unit/L Final     Alanine Aminotransferase   Date Value Ref Range Status   08/29/2022 18 0 - 55 unit/L Final     Estimated GFR-Non    Date Value Ref Range Status   04/29/2022 41 >=90      Lab Results   Component Value Date    CHOL 271 (H) 08/29/2022     Lab Results   Component Value Date    HDL 54 08/29/2022     No results found for: LDLCALC  Lab Results   Component Value Date    TRIG 190 (H) 08/29/2022     No results found for: CHOLHDL  Lab Results   Component Value Date    TSH 0.5379 12/22/2021     Lab Results   Component Value Date    PHUR 5.5 01/26/2022    PROTEINUA 1+ (A) 08/29/2022    GLUCUA 3+ (A) 01/26/2022    KETONESU Negative 01/26/2022    OCCULTUA Negative 01/26/2022    NITRITE Negative 01/26/2022    LEUKOCYTESUR 250 (A) 08/29/2022     Lab Results   Component Value Date    HGBA1C 12.1 (H) 08/29/2022    HGBA1C 13.0 (H) 01/26/2022    HGBA1C 10.9 (H) 12/30/2019     No results found for: MICROALBUR, [id number]   No results found for this or any previous visit.         Assessment and Plan (including Health Maintenance)     Plan:         Health Maintenance Due   Topic Date Due    Hepatitis C Screening  Never done    COVID-19 Vaccine (1) Never done    Foot Exam  Never done    Eye Exam  Never done    HIV Screening  Never done    Colorectal Cancer Screening  Never done    Shingles Vaccine (1 of 2) Never done    Mammogram  09/10/2019    Influenza Vaccine (1) Never done       Problem List Items Addressed This Visit          Psychiatric    Depression with anxiety    Current Assessment & Plan     RX fluoxetine  10 mg daily   Read positive daily meditations, avoid negative media, set healthy boundaries.  Exercise daily, keep consistent sleep pattern, eat a healthy diet.  Establish good social support, make changes to reduce stress.  Do not drink alcohol or use illicit drugs.  Reports any symptoms of suicidal/homicidal ideations or self harm immediately, go to nearest emergency room.           Relevant Medications    FLUoxetine 10 MG capsule       Cardiac/Vascular    Hypertension (Chronic)    Current Assessment & Plan     RX lisinopril 10 mg daily  Low Sodium Diet (Dash Diet - less than 2 grams of sodium per day).  Maintain healthy weight with goal BMI <30. Exercise 30 minutes per day 5 days per week.           Relevant Medications    lisinopriL 10 MG tablet    Hyperlipidemia    Current Assessment & Plan     RX atorvastatin 40 mg every evening  Follow a low cholesterol, low saturated fat diet with less than 200 mg of cholesterol a day.   Avoid fried foods and high saturated fats (judd, sausage, cookies, cakes, chips, cheese, whole milk, butter, mayonnaise, creamy dressings, gravy, stew, gumbo, boudin, cracklins and cream sauces).  Add flax seed or fish oil supplements to diet.   Increase dietary fiber.   Regular exercise improves cholesterol levels.  Physical activity 5 times a week for 30 minutes per day (or 150 minutes per week).   Stressed importance of dietary modifications.           Relevant Medications    atorvastatin (LIPITOR) 40 MG tablet       ID    Skin yeast infection    Current Assessment & Plan     RX Clotrimazole / betamethasone cream BID         Relevant Medications    clotrimazole-betamethasone 1-0.05% (LOTRISONE) cream       Endocrine    Type 2 diabetes mellitus with stage 3a chronic kidney disease, with long-term current use of insulin - Primary    Current Assessment & Plan     RX metformin 500 mg BID  RX glipizide 10 mg BID  RX lantus 50 units BID   3 month f/u with labs and medications   Follow ADA  "diet.  Avoid soda, simple sweets, and limit rice/pasta/bread/starches and consume brown options when possible.   Maintain healthy weight with BMI goal <30.   Perform aerobic exercise for 150 minutes per week (or 5 days a week for 30 minutes each day).   Examine feet daily.            Relevant Medications    metFORMIN (GLUCOPHAGE) 500 MG tablet    BD ULTRA-FINE JAKI PEN NEEDLE 32 gauge x 5/32" Ndle    glipiZIDE (GLUCOTROL) 10 MG tablet    LANTUS SOLOSTAR U-100 INSULIN glargine 100 units/mL SubQ pen    blood sugar diagnostic Strp    lancets 17 gauge Misc    Other Relevant Orders    Diabetic Eye Screening Photo    Urinalysis, Reflex to Urine Culture Urine, Clean Catch    Microalbumin/Creatinine Ratio, Urine    Basic Metabolic Panel    Hemoglobin A1C    Ambulatory referral/consult to Endocrinology    Ambulatory referral/consult to Diabetes Education       GI    Gastroesophageal reflux disease    Current Assessment & Plan     RX omeprazole 40 mg daily   Avoid spicy, acidic, fried foods and alcohol.  Eat 2-3 hours before going to bed.  Avoid tight clothing, chew food thoroughly.  Reduce caffeine intake, avoid soda.  Stressed importance of Smoking/Tobacco Cessation.           Relevant Medications    omeprazole (PRILOSEC) 40 MG capsule       Other    Cigarette nicotine dependence without complication    Current Assessment & Plan     Smoking cessation discussed > 3 mins  Pt not ready to quit.  Discussed benefits of quitting including improved health, decreased cardiac/vascular/pulmonary/stroke risks as well as saving money.            Other Visit Diagnoses       Immunization due        Wellness examination        Relevant Orders    TSH    T4, Free    Encounter for screening mammogram for malignant neoplasm of breast        Relevant Orders    Mammo Digital Screening Bilat            Health Maintenance Topics with due status: Not Due       Topic Last Completion Date    Pneumococcal Vaccines (Age 0-64) 12/22/2021    TETANUS " VACCINE 01/26/2022    Low Dose Statin 07/05/2022    Diabetes Urine Screening 08/29/2022    Lipid Panel 08/29/2022    Hemoglobin A1c 08/29/2022       Future Appointments   Date Time Provider Department Center   1/10/2023  1:30 PM [facility]            Signature:     [facility]  [address] [place] [place] [place] [address]    Date of encounter: 9/14/22        Established Patient - Audio Only Telehealth Visit     The patient location is: home  The chief complaint leading to consultation is: lab review  Visit type: Virtual visit with audio only (telephone)  Total time spent with patient: 27 mins       The reason for the audio only service rather than synchronous audio and video virtual visit was related to technical difficulties or patient preference/necessity.     Each patient to whom I provide medical services by telemedicine is:  (1) informed of the relationship between the physician and patient and the respective role of any other health care provider with respect to management of the patient; and (2) notified that they may decline to receive medical services by telemedicine and may withdraw from such care at any time. Patient verbally consented to receive this service via voice-only telephone call.         This service was not originating from a related E/M service provided within the previous 7 days nor will  to an E/M service or procedure within the next 24 hours or my soonest available appointment.  Prevailing standard of care was able to be met in this audio-only visit.

## 2022-09-14 NOTE — ASSESSMENT & PLAN NOTE
RX metformin 500 mg BID  RX glipizide 10 mg BID  RX lantus 50 units BID   3 month f/u with labs and medications   Follow ADA diet.  Avoid soda, simple sweets, and limit rice/pasta/bread/starches and consume brown options when possible.   Maintain healthy weight with BMI goal <30.   Perform aerobic exercise for 150 minutes per week (or 5 days a week for 30 minutes each day).   Examine feet daily.

## 2022-09-14 NOTE — ASSESSMENT & PLAN NOTE
RX fluoxetine 10 mg daily   Read positive daily meditations, avoid negative media, set healthy boundaries.  Exercise daily, keep consistent sleep pattern, eat a healthy diet.  Establish good social support, make changes to reduce stress.  Do not drink alcohol or use illicit drugs.  Reports any symptoms of suicidal/homicidal ideations or self harm immediately, go to nearest emergency room.

## 2022-09-14 NOTE — ASSESSMENT & PLAN NOTE
RX atorvastatin 40 mg every evening  Follow a low cholesterol, low saturated fat diet with less than 200 mg of cholesterol a day.   Avoid fried foods and high saturated fats (judd, sausage, cookies, cakes, chips, cheese, whole milk, butter, mayonnaise, creamy dressings, gravy, stew, gumbo, boudin, cracklins and cream sauces).  Add flax seed or fish oil supplements to diet.   Increase dietary fiber.   Regular exercise improves cholesterol levels.  Physical activity 5 times a week for 30 minutes per day (or 150 minutes per week).   Stressed importance of dietary modifications.

## 2022-09-14 NOTE — ASSESSMENT & PLAN NOTE
RX lisinopril 10 mg daily  Low Sodium Diet (Dash Diet - less than 2 grams of sodium per day).  Maintain healthy weight with goal BMI <30. Exercise 30 minutes per day 5 days per week.

## 2022-09-14 NOTE — ASSESSMENT & PLAN NOTE
RX omeprazole 40 mg daily   Avoid spicy, acidic, fried foods and alcohol.  Eat 2-3 hours before going to bed.  Avoid tight clothing, chew food thoroughly.  Reduce caffeine intake, avoid soda.  Stressed importance of Smoking/Tobacco Cessation.

## 2022-09-21 DIAGNOSIS — Z79.4 TYPE 2 DIABETES MELLITUS WITH STAGE 3A CHRONIC KIDNEY DISEASE, WITH LONG-TERM CURRENT USE OF INSULIN: ICD-10-CM

## 2022-09-21 DIAGNOSIS — N18.31 TYPE 2 DIABETES MELLITUS WITH STAGE 3A CHRONIC KIDNEY DISEASE, WITH LONG-TERM CURRENT USE OF INSULIN: ICD-10-CM

## 2022-09-21 DIAGNOSIS — E11.22 TYPE 2 DIABETES MELLITUS WITH STAGE 3A CHRONIC KIDNEY DISEASE, WITH LONG-TERM CURRENT USE OF INSULIN: ICD-10-CM

## 2022-09-21 NOTE — TELEPHONE ENCOUNTER
Good afternoon! Sent this to you because you saw pt on 9/14, and everything was e-scribed except for this. It showed as print. I fixed the order, so it's good to go.

## 2022-10-20 RX ORDER — CYCLOBENZAPRINE HCL 10 MG
10 TABLET ORAL 3 TIMES DAILY
Qty: 30 TABLET | Refills: 1 | Status: SHIPPED | OUTPATIENT
Start: 2022-10-20 | End: 2023-06-20 | Stop reason: SDUPTHER

## 2022-11-08 ENCOUNTER — TELEPHONE (OUTPATIENT)
Dept: INTERNAL MEDICINE | Facility: CLINIC | Age: 57
End: 2022-11-08
Payer: MEDICAID

## 2023-01-03 DIAGNOSIS — R06.2 WHEEZING: Primary | ICD-10-CM

## 2023-01-03 DIAGNOSIS — N18.31 TYPE 2 DIABETES MELLITUS WITH STAGE 3A CHRONIC KIDNEY DISEASE, WITH LONG-TERM CURRENT USE OF INSULIN: ICD-10-CM

## 2023-01-03 DIAGNOSIS — E11.22 TYPE 2 DIABETES MELLITUS WITH STAGE 3A CHRONIC KIDNEY DISEASE, WITH LONG-TERM CURRENT USE OF INSULIN: ICD-10-CM

## 2023-01-03 DIAGNOSIS — Z79.4 TYPE 2 DIABETES MELLITUS WITH STAGE 3A CHRONIC KIDNEY DISEASE, WITH LONG-TERM CURRENT USE OF INSULIN: ICD-10-CM

## 2023-01-03 RX ORDER — ALBUTEROL SULFATE 90 UG/1
2 AEROSOL, METERED RESPIRATORY (INHALATION) EVERY 6 HOURS PRN
Qty: 54 G | Refills: 4 | Status: SHIPPED | OUTPATIENT
Start: 2023-01-03

## 2023-03-16 ENCOUNTER — DOCUMENTATION ONLY (OUTPATIENT)
Dept: ADMINISTRATIVE | Facility: HOSPITAL | Age: 58
End: 2023-03-16
Payer: MEDICAID

## 2023-03-18 NOTE — TELEPHONE ENCOUNTER
Pt was a No Show for our LOV in December. New labs have been ordered, the pt should complete these and reschedule her f/u.    Thanks.    show

## 2023-06-19 ENCOUNTER — TELEPHONE (OUTPATIENT)
Dept: INTERNAL MEDICINE | Facility: CLINIC | Age: 58
End: 2023-06-19
Payer: MEDICAID

## 2023-06-19 NOTE — TELEPHONE ENCOUNTER
Contacted pt to discuss noncompliance with office visits. The pt reports it is due to a lack of transportation to this side of [place]. Was able to contact [facility] and confirm insurance acceptance and also new patient appts, pt will call and make new appt. We will fax LOV note for patient as well.     Thanks,    MERCEDES Rizo

## 2023-06-20 DIAGNOSIS — E78.2 MIXED HYPERLIPIDEMIA: ICD-10-CM

## 2023-06-20 DIAGNOSIS — I10 PRIMARY HYPERTENSION: Chronic | ICD-10-CM

## 2023-06-20 DIAGNOSIS — N18.31 TYPE 2 DIABETES MELLITUS WITH STAGE 3A CHRONIC KIDNEY DISEASE, WITH LONG-TERM CURRENT USE OF INSULIN: ICD-10-CM

## 2023-06-20 DIAGNOSIS — F41.8 DEPRESSION WITH ANXIETY: ICD-10-CM

## 2023-06-20 DIAGNOSIS — Z79.4 TYPE 2 DIABETES MELLITUS WITH STAGE 3A CHRONIC KIDNEY DISEASE, WITH LONG-TERM CURRENT USE OF INSULIN: ICD-10-CM

## 2023-06-20 DIAGNOSIS — K21.9 GASTROESOPHAGEAL REFLUX DISEASE, UNSPECIFIED WHETHER ESOPHAGITIS PRESENT: ICD-10-CM

## 2023-06-20 DIAGNOSIS — E11.22 TYPE 2 DIABETES MELLITUS WITH STAGE 3A CHRONIC KIDNEY DISEASE, WITH LONG-TERM CURRENT USE OF INSULIN: ICD-10-CM

## 2023-06-20 RX ORDER — PHENYTOIN SODIUM 100 MG/1
100 CAPSULE, EXTENDED RELEASE ORAL 3 TIMES DAILY
Qty: 90 CAPSULE | Refills: 0 | Status: SHIPPED | OUTPATIENT
Start: 2023-06-20 | End: 2023-07-20

## 2023-06-20 RX ORDER — INSULIN GLARGINE 100 [IU]/ML
50 INJECTION, SOLUTION SUBCUTANEOUS 2 TIMES DAILY
Qty: 15 EACH | Refills: 0 | Status: SHIPPED | OUTPATIENT
Start: 2023-06-20 | End: 2023-07-20

## 2023-06-20 RX ORDER — CYCLOBENZAPRINE HCL 10 MG
10 TABLET ORAL 3 TIMES DAILY
Qty: 30 TABLET | Refills: 1 | Status: SHIPPED | OUTPATIENT
Start: 2023-06-20

## 2023-06-20 RX ORDER — OMEPRAZOLE 40 MG/1
40 CAPSULE, DELAYED RELEASE ORAL 2 TIMES DAILY
Qty: 60 CAPSULE | Refills: 0 | Status: SHIPPED | OUTPATIENT
Start: 2023-06-20 | End: 2023-07-20

## 2023-06-20 RX ORDER — METFORMIN HYDROCHLORIDE 500 MG/1
500 TABLET ORAL 2 TIMES DAILY WITH MEALS
Qty: 60 TABLET | Refills: 0 | Status: SHIPPED | OUTPATIENT
Start: 2023-06-20 | End: 2023-07-20

## 2023-06-20 RX ORDER — FLUOXETINE 10 MG/1
10 CAPSULE ORAL DAILY
Qty: 30 CAPSULE | Refills: 0 | Status: SHIPPED | OUTPATIENT
Start: 2023-06-20 | End: 2023-07-20

## 2023-06-20 RX ORDER — ATORVASTATIN CALCIUM 40 MG/1
40 TABLET, FILM COATED ORAL DAILY
Qty: 90 TABLET | Refills: 0 | Status: SHIPPED | OUTPATIENT
Start: 2023-06-20 | End: 2023-09-18

## 2023-06-20 RX ORDER — LISINOPRIL 10 MG/1
10 TABLET ORAL DAILY
Qty: 90 TABLET | Refills: 1 | Status: SHIPPED | OUTPATIENT
Start: 2023-06-20 | End: 2023-12-17

## 2023-06-20 RX ORDER — GLIPIZIDE 10 MG/1
10 TABLET ORAL 2 TIMES DAILY
Qty: 60 TABLET | Refills: 0 | Status: SHIPPED | OUTPATIENT
Start: 2023-06-20 | End: 2023-07-20

## 2023-06-20 NOTE — TELEPHONE ENCOUNTER
Patient will be patient of Dr. Dominguez . First appt. Will be on 07/26/2023 . Please refill medications for a month.

## 2024-02-27 ENCOUNTER — HOSPITAL ENCOUNTER (EMERGENCY)
Facility: HOSPITAL | Age: 59
Discharge: HOME | End: 2024-02-27
Attending: FAMILY MEDICINE
Payer: MEDICAID

## 2024-02-27 VITALS
SYSTOLIC BLOOD PRESSURE: 176 MMHG | HEART RATE: 93 BPM | OXYGEN SATURATION: 100 % | HEIGHT: 60 IN | WEIGHT: 167.56 LBS | RESPIRATION RATE: 16 BRPM | DIASTOLIC BLOOD PRESSURE: 95 MMHG | BODY MASS INDEX: 32.89 KG/M2 | TEMPERATURE: 99 F

## 2024-02-27 DIAGNOSIS — R53.1 WEAKNESS: ICD-10-CM

## 2024-02-27 DIAGNOSIS — E83.42 HYPOMAGNESEMIA: ICD-10-CM

## 2024-02-27 DIAGNOSIS — R29.6 FREQUENT FALLS: Primary | ICD-10-CM

## 2024-02-27 LAB
ALBUMIN SERPL-MCNC: 2.9 G/DL (ref 3.5–5)
ALBUMIN/GLOB SERPL: 0.8 RATIO (ref 1.1–2)
ALP SERPL-CCNC: 80 UNIT/L (ref 40–150)
ALT SERPL-CCNC: 12 UNIT/L (ref 0–55)
APPEARANCE UR: ABNORMAL
AST SERPL-CCNC: 10 UNIT/L (ref 5–34)
BACTERIA #/AREA URNS AUTO: ABNORMAL /HPF
BASOPHILS # BLD AUTO: 0.03 X10(3)/MCL
BASOPHILS NFR BLD AUTO: 0.5 %
BILIRUB SERPL-MCNC: 0.1 MG/DL
BILIRUB UR QL STRIP.AUTO: NEGATIVE
BNP BLD-MCNC: 31.7 PG/ML
BUN SERPL-MCNC: 20.1 MG/DL (ref 9.8–20.1)
CALCIUM SERPL-MCNC: 8.3 MG/DL (ref 8.4–10.2)
CHLORIDE SERPL-SCNC: 107 MMOL/L (ref 98–107)
CO2 SERPL-SCNC: 22 MMOL/L (ref 22–29)
COLOR UR AUTO: ABNORMAL
CREAT SERPL-MCNC: 1.02 MG/DL (ref 0.55–1.02)
EOSINOPHIL # BLD AUTO: 0.07 X10(3)/MCL (ref 0–0.9)
EOSINOPHIL NFR BLD AUTO: 1.1 %
ERYTHROCYTE [DISTWIDTH] IN BLOOD BY AUTOMATED COUNT: 12.6 % (ref 11.5–17)
GFR SERPLBLD CREATININE-BSD FMLA CKD-EPI: >60 MLS/MIN/1.73/M2
GLOBULIN SER-MCNC: 3.7 GM/DL (ref 2.4–3.5)
GLUCOSE SERPL-MCNC: 257 MG/DL (ref 74–100)
GLUCOSE UR QL STRIP.AUTO: ABNORMAL
HCT VFR BLD AUTO: 34.2 % (ref 37–47)
HGB BLD-MCNC: 11 G/DL (ref 12–16)
HOLD SPECIMEN: NORMAL
HYALINE CASTS #/AREA URNS LPF: ABNORMAL /LPF
IMM GRANULOCYTES # BLD AUTO: 0.02 X10(3)/MCL (ref 0–0.04)
IMM GRANULOCYTES NFR BLD AUTO: 0.3 %
KETONES UR QL STRIP.AUTO: NEGATIVE
LEUKOCYTE ESTERASE UR QL STRIP.AUTO: NEGATIVE
LYMPHOCYTES # BLD AUTO: 2.3 X10(3)/MCL (ref 0.6–4.6)
LYMPHOCYTES NFR BLD AUTO: 36.9 %
MAGNESIUM SERPL-MCNC: 1.4 MG/DL (ref 1.6–2.6)
MCH RBC QN AUTO: 28.9 PG (ref 27–31)
MCHC RBC AUTO-ENTMCNC: 32.2 G/DL (ref 33–36)
MCV RBC AUTO: 89.8 FL (ref 80–94)
MONOCYTES # BLD AUTO: 0.39 X10(3)/MCL (ref 0.1–1.3)
MONOCYTES NFR BLD AUTO: 6.3 %
NEUTROPHILS # BLD AUTO: 3.42 X10(3)/MCL (ref 2.1–9.2)
NEUTROPHILS NFR BLD AUTO: 54.9 %
NITRITE UR QL STRIP.AUTO: NEGATIVE
NRBC BLD AUTO-RTO: 0 %
PH UR STRIP.AUTO: 5.5 [PH]
PLATELET # BLD AUTO: 336 X10(3)/MCL (ref 130–400)
PMV BLD AUTO: 10 FL (ref 7.4–10.4)
POCT GLUCOSE: 242 MG/DL (ref 70–110)
POTASSIUM SERPL-SCNC: 4.6 MMOL/L (ref 3.5–5.1)
PROT SERPL-MCNC: 6.6 GM/DL (ref 6.4–8.3)
PROT UR QL STRIP.AUTO: ABNORMAL
RBC # BLD AUTO: 3.81 X10(6)/MCL (ref 4.2–5.4)
RBC #/AREA URNS AUTO: ABNORMAL /HPF
RBC UR QL AUTO: NEGATIVE
SODIUM SERPL-SCNC: 138 MMOL/L (ref 136–145)
SP GR UR STRIP.AUTO: 1.01 (ref 1–1.03)
SQUAMOUS #/AREA URNS LPF: ABNORMAL /HPF
TROPONIN I SERPL-MCNC: <0.01 NG/ML (ref 0–0.04)
UROBILINOGEN UR STRIP-ACNC: NORMAL
WBC # SPEC AUTO: 6.23 X10(3)/MCL (ref 4.5–11.5)
WBC #/AREA URNS AUTO: ABNORMAL /HPF

## 2024-02-27 PROCEDURE — 84484 ASSAY OF TROPONIN QUANT: CPT | Performed by: PHYSICIAN ASSISTANT

## 2024-02-27 PROCEDURE — 80053 COMPREHEN METABOLIC PANEL: CPT | Performed by: PHYSICIAN ASSISTANT

## 2024-02-27 PROCEDURE — 96365 THER/PROPH/DIAG IV INF INIT: CPT

## 2024-02-27 PROCEDURE — 93005 ELECTROCARDIOGRAM TRACING: CPT

## 2024-02-27 PROCEDURE — 83880 ASSAY OF NATRIURETIC PEPTIDE: CPT | Performed by: PHYSICIAN ASSISTANT

## 2024-02-27 PROCEDURE — 63600175 PHARM REV CODE 636 W HCPCS: Performed by: PHYSICIAN ASSISTANT

## 2024-02-27 PROCEDURE — 85025 COMPLETE CBC W/AUTO DIFF WBC: CPT | Performed by: PHYSICIAN ASSISTANT

## 2024-02-27 PROCEDURE — 99284 EMERGENCY DEPT VISIT MOD MDM: CPT | Mod: 25

## 2024-02-27 PROCEDURE — 82962 GLUCOSE BLOOD TEST: CPT

## 2024-02-27 PROCEDURE — 81001 URINALYSIS AUTO W/SCOPE: CPT | Performed by: PHYSICIAN ASSISTANT

## 2024-02-27 PROCEDURE — 25000003 PHARM REV CODE 250: Performed by: FAMILY MEDICINE

## 2024-02-27 PROCEDURE — 83735 ASSAY OF MAGNESIUM: CPT | Performed by: PHYSICIAN ASSISTANT

## 2024-02-27 RX ORDER — MAGNESIUM SULFATE HEPTAHYDRATE 40 MG/ML
2 INJECTION, SOLUTION INTRAVENOUS
Status: COMPLETED | OUTPATIENT
Start: 2024-02-27 | End: 2024-02-27

## 2024-02-27 RX ORDER — CALCIUM CARBONATE 300MG(750)
1 TABLET,CHEWABLE ORAL 3 TIMES DAILY
Qty: 15 TABLET | Refills: 0 | Status: SHIPPED | OUTPATIENT
Start: 2024-02-27 | End: 2024-02-27

## 2024-02-27 RX ORDER — CALCIUM CARBONATE 300MG(750)
1 TABLET,CHEWABLE ORAL 3 TIMES DAILY
Qty: 15 TABLET | Refills: 0 | Status: SHIPPED | OUTPATIENT
Start: 2024-02-27 | End: 2024-03-03

## 2024-02-27 RX ADMIN — SODIUM CHLORIDE 1000 ML: 9 INJECTION, SOLUTION INTRAVENOUS at 09:02

## 2024-02-27 RX ADMIN — MAGNESIUM SULFATE HEPTAHYDRATE 2 G: 40 INJECTION, SOLUTION INTRAVENOUS at 09:02

## 2024-02-28 LAB
OHS QRS DURATION: 82 MS
OHS QTC CALCULATION: 412 MS

## 2024-02-28 NOTE — ED PROVIDER NOTES
Encounter Date: 2/27/2024       History     Chief Complaint   Patient presents with    Weakness     Pt c/o weakness, cbg 350 per [facility], was 242 in triage, pt alert and oriented vss, pt reports hy of blood clots and is taking blood thinners, uses walker at home.      58-year-old female with past medical history significant for hypertension, hyperlipidemia, diabetes, obesity, PVD, seizure disorder, smoking, asthma, depression and anxiety presents to ED complaining of bilateral lower extremity weakness.  Patient is a very poor historian and so is her future daughter-in-law that his whether, so it is hard to gain an accurate history.  Patient reports she has been falling frequently at home and states this happens when her sugar becomes elevated, but also states her blood sugars are very well controlled.  Patient reports 2 days ago she fell and hit her head, denies loss of consciousness at that time.  Patient reports since that time she has been experiencing intermittent numbness in her right arm and right leg.  Patient reports she went to use the bathroom tonight, but someone had left the seat up and this caused her to fall into the toilet.  Patient reports family members were able to get her out of the toilet, but then she checked her sugars and they were high, so EMS was called.  CBG of 242 on arrival to ED.  Patient denies recent seizures, headache, dizziness, vision changes, slurred speech, confusion, ataxia, abnormal balance, nausea, vomiting, chest pain, shortness of breath, palpitations, diaphoresis, syncope, orthopnea, edema, fever, chills, cough, congestion, dysuria, hematuria, incontinence, urinary retention, saddle anesthesia.  Vital signs stable on arrival, patient in no acute distress.        Review of patient's allergies indicates:  No Known Allergies  Past Medical History:   Diagnosis Date    Diabetes mellitus     Hypertension      Past Surgical History:   Procedure Laterality Date    APPENDECTOMY       CATARACT EXTRACTION Bilateral      SECTION      CYST REMOVAL      HERNIA REPAIR      HYSTERECTOMY       Family History   Problem Relation Age of Onset    Diabetes Mother     Heart disease Father     Kidney disease Brother      Social History     Tobacco Use    Smoking status: Former     Types: Cigarettes     Start date:      Quit date:      Years since quittin.1    Smokeless tobacco: Never   Substance Use Topics    Alcohol use: Never    Drug use: Never     Review of Systems   All other systems reviewed and are negative.      Physical Exam     Initial Vitals [24 1931]   BP Pulse Resp Temp SpO2   (!) 162/65 94 20 98.7 °F (37.1 °C) 97 %      MAP       --         Physical Exam    Nursing note and vitals reviewed.  Constitutional: She appears well-developed and well-nourished. She is not diaphoretic. No distress.   HENT:   Head: Normocephalic and atraumatic. Head is without raccoon's eyes, without Rojas's sign, without abrasion, without contusion and without laceration.   Right Ear: No drainage. No hemotympanum.   Left Ear: No drainage. No hemotympanum.   Nose: No rhinorrhea. No epistaxis.   Mouth/Throat: Oropharynx is clear and moist.   Eyes: Conjunctivae and EOM are normal. Pupils are equal, round, and reactive to light.   Neck: Neck supple. No Brudzinski's sign and no Kernig's sign noted. No JVD present.   Normal range of motion.   Full passive range of motion without pain.     Cardiovascular:  Normal rate, regular rhythm, normal heart sounds and intact distal pulses.     Exam reveals no gallop and no friction rub.       No murmur heard.  Pulmonary/Chest: Breath sounds normal. No respiratory distress. She has no wheezes. She has no rhonchi. She has no rales.   Abdominal: Abdomen is soft. Bowel sounds are normal. She exhibits no distension and no mass. There is no abdominal tenderness. There is no rebound and no guarding.   Genitourinary: Rectum:      No abnormal anal tone.      Musculoskeletal:         General: No tenderness or edema. Normal range of motion.      Cervical back: Full passive range of motion without pain, normal range of motion and neck supple. No edema or rigidity. No spinous process tenderness or muscular tenderness. Normal range of motion.     Neurological: She is alert and oriented to person, place, and time. She has normal strength and normal reflexes. She is not disoriented. She displays no tremor. No cranial nerve deficit or sensory deficit. She exhibits normal muscle tone. She displays no seizure activity. Gait (Patient able to bear weight, but unable to ambulate without significant assistance due to lower extremity weakness) abnormal. Coordination normal. GCS score is 15. GCS eye subscore is 4. GCS verbal subscore is 5. GCS motor subscore is 6.   Skin: Skin is warm and dry. Capillary refill takes less than 2 seconds. No rash noted. No pallor.   Psychiatric: She has a normal mood and affect. Thought content normal.         ED Course   Procedures  Labs Reviewed   COMPREHENSIVE METABOLIC PANEL - Abnormal; Notable for the following components:       Result Value    Glucose Level 257 (*)     Calcium Level Total 8.3 (*)     Albumin Level 2.9 (*)     Globulin 3.7 (*)     Albumin/Globulin Ratio 0.8 (*)     All other components within normal limits   URINALYSIS, REFLEX TO URINE CULTURE - Abnormal; Notable for the following components:    Appearance, UA Turbid (*)     Protein, UA 1+ (*)     Glucose, UA 1+ (*)     Bacteria, UA Trace (*)     Squamous Epithelial Cells, UA Many (*)     Hyaline Casts, UA 0-2 (*)     All other components within normal limits   MAGNESIUM - Abnormal; Notable for the following components:    Magnesium Level 1.40 (*)     All other components within normal limits   CBC WITH DIFFERENTIAL - Abnormal; Notable for the following components:    RBC 3.81 (*)     Hgb 11.0 (*)     Hct 34.2 (*)     MCHC 32.2 (*)     All other components within normal limits    POCT GLUCOSE - Abnormal; Notable for the following components:    POCT Glucose 242 (*)     All other components within normal limits   TROPONIN I - Normal   B-TYPE NATRIURETIC PEPTIDE - Normal   CBC W/ AUTO DIFFERENTIAL    Narrative:     The following orders were created for panel order CBC Auto Differential.  Procedure                               Abnormality         Status                     ---------                               -----------         ------                     CBC with Differential[[id number]]       Abnormal            Final result                 Please view results for these tests on the individual orders.   EXTRA TUBES    Narrative:     The following orders were created for panel order EXTRA TUBES.  Procedure                               Abnormality         Status                     ---------                               -----------         ------                     Light Blue Top Hold[[id number]]                             Final result               Gold Top Hold[[id number]]                                   Final result               Pink Top Hold[[id number]]                                   Final result                 Please view results for these tests on the individual orders.   LIGHT BLUE TOP HOLD   GOLD TOP HOLD   PINK TOP HOLD     EKG Readings: (Independently Interpreted)   Initial Reading: No STEMI. Rhythm: Normal Sinus Rhythm. Heart Rate: 94. Ectopy: No Ectopy. Conduction: Normal. ST Segments: Normal ST Segments. Axis: Normal. Clinical Impression: Normal Sinus Rhythm       Imaging Results    None          Medications   magnesium sulfate 2g in water 50mL IVPB (premix) (2 g Intravenous New Bag 2/27/24 2114)   sodium chloride 0.9% bolus 1,000 mL 1,000 mL (1,000 mLs Intravenous New Bag 2/27/24 2114)     Medical Decision Making  Differential diagnosis: Includes but not limited to CVA, vertebral fracture, spinal cord injury, cauda equina, ACS, electrolyte abnormality, UTI    ED  management:    ED course:        Amount and/or Complexity of Data Reviewed  Labs: ordered.  Radiology: ordered.    Risk  OTC drugs.  Prescription drug management.               ED Course as of 02/27/24 2217 Tue Feb 27, 2024 2054 While walking back to acute side of ED from FT, patient was noted to be walking to bathroom with minimal assistance from family. [NB]   2105 Patient care transitioned to Dr. Whitaker [NB]   2142 Normal WBC at 6.23.  Normal H/H.  Troponin < 0.010.  UA shows many squamous epithelial cells with trace bacteria and negative for WBCs, Leukocyte Esterase, and Nitrites, likely indicating a contaminated sample.  Magnesium of 1.4 - currently receiving magnesium infusion. [MW]   2210 Patient no longer desires to have CT scans performed, and feeling improved; ambulated to the bathroom without difficulty.  Stable for discharge to home.  ER precautions for any acute worsening. [MW]   2217 I have reviewed the the PAs documentation, agree with the PAs assessment, and I had face-to-face time with the patient.  I personally made/approved the management plan for this patient and take responsibility for the patient management.   [MW]      ED Course User Index  [MW] Immanuel Whitaker MD  [NB] Anthony Limon PA                           Clinical Impression:  Final diagnoses:  [R53.1] Weakness  [R29.6] Frequent falls (Primary)  [E83.42] Hypomagnesemia          ED Disposition Condition    Discharge Stable          ED Prescriptions       Medication Sig Dispense Start Date End Date Auth. Provider    magnesium oxide 400 mg magnesium Tab  (Status: Discontinued) Take 1 tablet by mouth 3 (three) times daily. for 5 days 15 tablet 2/27/2024 2/27/2024 Immanuel Whitaker MD    magnesium oxide 400 mg magnesium Tab Take 1 tablet by mouth 3 (three) times daily. for 5 days 15 tablet 2/27/2024 3/3/2024 Immanuel Whitaker MD          Follow-up Information       Follow up With Specialties Details Why Contact  Info    [facility] Emergency Medicine  As needed, If symptoms worsen [address] [facility] [address] [phone]    Primary Care Physician  In 5 days               Immanuel Whitaker MD  02/27/24 [id number]

## 2025-07-01 DIAGNOSIS — S82.001A CLOSED NONDISPLACED FRACTURE OF RIGHT PATELLA, UNSPECIFIED FRACTURE MORPHOLOGY, INITIAL ENCOUNTER: Primary | ICD-10-CM
